# Patient Record
Sex: FEMALE | Race: WHITE | Employment: FULL TIME | URBAN - METROPOLITAN AREA
[De-identification: names, ages, dates, MRNs, and addresses within clinical notes are randomized per-mention and may not be internally consistent; named-entity substitution may affect disease eponyms.]

---

## 2018-08-14 ENCOUNTER — HOSPITAL ENCOUNTER (EMERGENCY)
Age: 38
Discharge: HOME | End: 2018-08-14
Attending: STUDENT IN AN ORGANIZED HEALTH CARE EDUCATION/TRAINING PROGRAM
Payer: COMMERCIAL

## 2018-08-14 VITALS
WEIGHT: 109.79 LBS | SYSTOLIC BLOOD PRESSURE: 134 MMHG | HEIGHT: 61 IN | OXYGEN SATURATION: 98 % | TEMPERATURE: 98.2 F | HEART RATE: 78 BPM | DIASTOLIC BLOOD PRESSURE: 81 MMHG | RESPIRATION RATE: 18 BRPM | BODY MASS INDEX: 20.73 KG/M2

## 2018-08-14 DIAGNOSIS — R00.2 PALPITATIONS: Primary | ICD-10-CM

## 2018-08-14 LAB
HCG UR QL: NEGATIVE
T4 FREE SERPL-MCNC: 1.1 NG/DL (ref 0.8–1.5)

## 2018-08-14 PROCEDURE — 36415 COLL VENOUS BLD VENIPUNCTURE: CPT | Performed by: PHYSICIAN ASSISTANT

## 2018-08-14 PROCEDURE — 81025 URINE PREGNANCY TEST: CPT

## 2018-08-14 PROCEDURE — 93005 ELECTROCARDIOGRAM TRACING: CPT

## 2018-08-14 PROCEDURE — 84439 ASSAY OF FREE THYROXINE: CPT | Performed by: PHYSICIAN ASSISTANT

## 2018-08-14 PROCEDURE — 99284 EMERGENCY DEPT VISIT MOD MDM: CPT

## 2018-08-14 NOTE — ED PROVIDER NOTES
HPI Comments: 44 y.o. female with past medical history significant for anemia, who presents from home with chief complaint of palpitations. Pt has 2 day onset of intermittent palpitations described as feeling like her Shar Hero is jumping out of her chest and skipping beats. \" Pt's sx returned again on Saturday morning, but resolved throughout the day. Today her palpitations have been constant w/o resolution, and decided to seek ED evaluation. Pt has accompanying chest discomfort described as pressure and SOB. There are no other acute medical concerns at this time. PCP: Sebastián Holliday MD    Note written by Annabel Livingston, as dictated by Rachel Villegas MD 3:35 PM      The history is provided by the patient. No  was used. Past Medical History:   Diagnosis Date    Abnormal Pap smear     HPV last pap. Will follow up after pregnancy    Anemia NEC     taking iron    Postpartum depression     Mild 3-4 months in duration       Past Surgical History:   Procedure Laterality Date    HX GYN      1994 ovarian cyst surgery    HX GYN      2 children    HX OTHER SURGICAL      ovarian cyst removed 1995    HX WISDOM TEETH EXTRACTION           Family History:   Problem Relation Age of Onset    Heart Disease Maternal Grandmother     Hypertension Maternal Grandmother     Cancer Maternal Grandfather      testicular, lung    Cancer Paternal Grandmother 54     Breast    Hypertension Paternal Grandmother     Cancer Paternal Grandfather      testicular, lung    Hypertension Mother     MS Sister        Social History     Social History    Marital status: UNKNOWN     Spouse name: N/A    Number of children: N/A    Years of education: N/A     Occupational History    Not on file.      Social History Main Topics    Smoking status: Current Every Day Smoker     Packs/day: 0.50     Years: 10.00    Smokeless tobacco: Never Used    Alcohol use 0.0 oz/week     0 Standard drinks or equivalent per week      Comment: rare    Drug use: No    Sexual activity: Not Currently     Partners: Male     Other Topics Concern    Not on file     Social History Narrative         ALLERGIES: Review of patient's allergies indicates no known allergies. Review of Systems   Constitutional: Negative for chills and fever. HENT: Negative for sore throat. Respiratory: Positive for shortness of breath. Negative for cough. Cardiovascular: Positive for chest pain and palpitations. Gastrointestinal: Negative for abdominal pain and vomiting. Genitourinary: Negative for dysuria. Musculoskeletal: Negative for back pain. Skin: Negative for rash. Neurological: Negative for syncope and headaches. Psychiatric/Behavioral: Negative for confusion. All other systems reviewed and are negative. Vitals:    08/14/18 1502   BP: 131/77   Pulse: (!) 105   Resp: 18   Temp: 98.2 °F (36.8 °C)   SpO2: 99%   Weight: 49.8 kg (109 lb 12.6 oz)   Height: 5' 0.5\" (1.537 m)            Physical Exam   Constitutional: She is oriented to person, place, and time. She appears well-developed. No distress. HENT:   Head: Normocephalic and atraumatic. Eyes: Conjunctivae and EOM are normal. Pupils are equal, round, and reactive to light. Neck: Normal range of motion. Neck supple. Cardiovascular: Normal rate, regular rhythm and normal heart sounds. No murmur heard. Pulmonary/Chest: Effort normal and breath sounds normal. No respiratory distress. Abdominal: Soft. Bowel sounds are normal. She exhibits no distension. There is no tenderness. There is no rebound. Musculoskeletal: Normal range of motion. She exhibits no edema. Neurological: She is alert and oriented to person, place, and time. No cranial nerve deficit. She exhibits normal muscle tone. Coordination normal.   Skin: Skin is warm and dry. No rash noted. Psychiatric: She has a normal mood and affect. Her behavior is normal.   Nursing note and vitals reviewed. Note written by Annabel Zacarias, as dictated by Mis Toth MD 3:35 PM    [facility]      ED Course       Procedures  ED EKG interpretation:  Rhythm: normal sinus rhythm; and regular . Rate (approx.): 94; Axis: normal; ST/T wave: normal;   Note written by Annabel Zacarias, as dictated by Mis Toth MD 3:19 PM      Pt with palpitations, no evidence of arrhythmia on today's ED eval. Pt in [address] [place] [address] most of the encounter so she was not able to have telemetry monitoring. I think she is stable for dc home and will refer to cardiology for further eval as EMC stabilized today. May need event recorder/Holter monitor. Discussed this with patient. She agrees with and understands this plan. All questions answered.

## 2018-08-14 NOTE — DISCHARGE INSTRUCTIONS
Palpitations: Care Instructions  Your Care Instructions    Heart palpitations are the uncomfortable sensation that your heart is beating fast or irregularly. You might feel pounding or fluttering in your chest. It might feel like your heart is skipping a beat. Although palpitations may be caused by a heart problem, they also occur because of stress, fatigue, or use of alcohol, caffeine, or nicotine. Many medicines, including diet pills, antihistamines, decongestants, and some herbal products, can cause heart palpitations. Nearly everyone has palpitations from time to time. Depending on your symptoms, your doctor may need to do more tests to try to find the cause of your palpitations. Follow-up care is a key part of your treatment and safety. Be sure to make and go to all appointments, and call your doctor if you are having problems. It's also a good idea to know your test results and keep a list of the medicines you take. How can you care for yourself at home? · Avoid caffeine, nicotine, and excess alcohol. · Do not take illegal drugs, such as methamphetamines and cocaine. · Do not take weight loss or diet medicines unless you talk with your doctor first.  · Get plenty of sleep. · Do not overeat. · If you have palpitations again, take deep breaths and try to relax. This may slow a racing heart. · If you start to feel lightheaded, lie down to avoid injuries that might result if you pass out and fall down. · Keep a record of your palpitations and bring it to your next doctor's appointment. Write down:  ¨ The date and time. ¨ Your pulse. (If your heart is beating fast, it may be hard to count your pulse.)  ¨ What you were doing when the palpitations started. ¨ How long the palpitations lasted. ¨ Any other symptoms. · If an activity causes palpitations, slow down or stop. Talk to your doctor before you do that activity again. · Take your medicines exactly as prescribed.  Call your doctor if you think you are having a problem with your medicine. When should you call for help? Call 911 anytime you think you may need emergency care. For example, call if:    · You passed out (lost consciousness).     · You have symptoms of a heart attack. These may include:  ¨ Chest pain or pressure, or a strange feeling in the chest.  ¨ Sweating. ¨ Shortness of breath. ¨ Pain, pressure, or a strange feeling in the back, neck, jaw, or upper belly or in one or both shoulders or arms. ¨ Lightheadedness or sudden weakness. ¨ A fast or irregular heartbeat. After you call 911, the  may tell you to chew 1 adult-strength or 2 to 4 low-dose aspirin. Wait for an ambulance. Do not try to drive yourself.     · You have symptoms of a stroke. These may include:  ¨ Sudden numbness, tingling, weakness, or loss of movement in your face, arm, or leg, especially on only one side of your body. ¨ Sudden vision changes. ¨ Sudden trouble speaking. ¨ Sudden confusion or trouble understanding simple statements. ¨ Sudden problems with walking or balance. ¨ A sudden, severe headache that is different from past headaches.    Call your doctor now or seek immediate medical care if:    · You have heart palpitations and:  ¨ Are dizzy or lightheaded, or you feel like you may faint. ¨ Have new or increased shortness of breath.    Watch closely for changes in your health, and be sure to contact your doctor if:    · You continue to have heart palpitations. Where can you learn more? Go to http://kaylene-rajendra.info/. Enter R508 in the search box to learn more about \"Palpitations: Care Instructions. \"  Current as of: December 6, 2017  Content Version: 11.7  © 0388-6980 [employer]. Care instructions adapted under license by [employer] (which disclaims liability or warranty for this information).  If you have questions about a medical condition or this instruction, always ask your healthcare professional. [employer] disclaims any warranty or liability for your use of this information.

## 2018-08-14 NOTE — ED NOTES
3:02 PM  I have evaluated the patient as the Provider in Triage. I have reviewed Her vital signs and the triage nurse assessment. I have talked with the patient and any available family and advised that I am the provider in triage and have ordered the appropriate study to initiate their work up based on the clinical presentation during my assessment. I have advised that the patient will be accommodated in the Main ED as soon as possible. I have also requested to contact the triage nurse or myself immediately if the patient experiences any changes in their condition during this brief waiting period. Intermittent CP since Sunday. Negative troponin, dimer, CXR at [facility] today. ? Abnormal EKG, pt referred here.                               MANAS Aguilar

## 2018-08-15 ENCOUNTER — OFFICE VISIT (OUTPATIENT)
Dept: CARDIOLOGY CLINIC | Age: 38
End: 2018-08-15

## 2018-08-15 ENCOUNTER — CLINICAL SUPPORT (OUTPATIENT)
Dept: CARDIOLOGY CLINIC | Age: 38
End: 2018-08-15

## 2018-08-15 VITALS
DIASTOLIC BLOOD PRESSURE: 86 MMHG | SYSTOLIC BLOOD PRESSURE: 140 MMHG | BODY MASS INDEX: 20.58 KG/M2 | RESPIRATION RATE: 16 BRPM | HEART RATE: 100 BPM | HEIGHT: 61 IN | WEIGHT: 109 LBS

## 2018-08-15 DIAGNOSIS — E78.5 DYSLIPIDEMIA, GOAL LDL BELOW 100: ICD-10-CM

## 2018-08-15 DIAGNOSIS — F17.200 TOBACCO DEPENDENCY: ICD-10-CM

## 2018-08-15 DIAGNOSIS — R00.2 PALPITATIONS: Primary | ICD-10-CM

## 2018-08-15 DIAGNOSIS — F43.9 STRESS AT HOME: ICD-10-CM

## 2018-08-15 DIAGNOSIS — I49.9 CARDIAC ARRHYTHMIA, UNSPECIFIED CARDIAC ARRHYTHMIA TYPE: ICD-10-CM

## 2018-08-15 LAB
ATRIAL RATE: 94 BPM
CALCULATED P AXIS, ECG09: 67 DEGREES
CALCULATED R AXIS, ECG10: 56 DEGREES
CALCULATED T AXIS, ECG11: 48 DEGREES
DIAGNOSIS, 93000: NORMAL
P-R INTERVAL, ECG05: 148 MS
Q-T INTERVAL, ECG07: 362 MS
QRS DURATION, ECG06: 84 MS
QTC CALCULATION (BEZET), ECG08: 452 MS
VENTRICULAR RATE, ECG03: 94 BPM

## 2018-08-15 NOTE — PROGRESS NOTES
[place] [address] [place]: Shantel Tate  [phone]  Requesting/referring provider: Dr. Anders Leon  Reason for Consult: palpitaitons    HPI: Carmen Reynaga, a 44y.o. year-old who presents for evaluation of palpitations on and off all day, a few at a time. It takes her breath away. She is very stressed, son broke his collarbone. bp is high today. Sleeping 5-6 hours a night. 1 soda, one coffee and one starbucks a day. T4 was wnl. Labs checked. At [facility] ok. Discussed stress, palpitations, causes, control and potential treatments. Assessment/Plan:  1. Dyslipidemia- diet control   2. Stress- work on exercise, meditation, yoga, sleeping, decrease coffee  3. Palpitations- SVT? Needs loop and echo to look for causes. 4. Tobacco use- counseled to quit, discussed nicotine in this role    Fhx gm with pacer, palpitaitons, mother with SVT  Soc +caffeine +tobacco, no etoh  She  has a past medical history of Abnormal Pap smear; Anemia NEC; and Postpartum depression. She also has no past medical history of Acquired hypothyroidism; Asthma; Complication of anesthesia; Diabetes mellitus; Essential hypertension; Genital herpes, unspecified; Heart abnormalities; Herpes gestationis; Herpes simplex without mention of complication; Human immunodeficiency virus (HIV) disease ([facility] [address] [facility] OTHER MEDICAL; Infertility; Kidney disease; Liver disease; Phlebitis and thrombophlebitis of unspecified site; Psychiatric problem; Rhesus isoimmunization unspecified as to episode of care in pregnancy; Sickle-cell disease, unspecified; Systemic lupus erythematosus ([facility] [address] [facility] Trauma; Unspecified breast disorder; Unspecified diseases of blood and blood-forming organs; or Unspecified epilepsy without mention of intractable epilepsy. Cardiovascular ROS: positive for - irregular heartbeat and palpitations  Respiratory ROS: no cough, shortness of breath, or wheezing  Neurological ROS: no TIA or stroke symptoms  All other systems negative except as above. PE  Vitals:    08/15/18 0945   BP: 140/86   Pulse: 100   Resp: 16   Weight: 109 lb (49.4 kg)   Height: 5' 0.5\" (1.537 m)    Body mass index is 20.94 kg/(m^2). General appearance - alert, well appearing, and in no distress  Mental status - affect appropriate to mood  Eyes - sclera anicteric, moist mucous membranes  Neck - supple, no significant adenopathy  Lymphatics - no  lymphadenopathy  Chest - clear to auscultation, no wheezes, rales or rhonchi  Heart - normal rate, regular rhythm, normal S1, S2, no murmurs, rubs, clicks or gallops  Abdomen - soft, nontender, nondistended, no masses or organomegaly  Back exam - full range of motion, no tenderness  Neurological - cranial nerves II through XII grossly intact, no focal deficit  Musculoskeletal - no muscular tenderness noted, normal strength  Extremities - peripheral pulses normal, no pedal edema  Skin - normal coloration  no rashes    Recent Labs:  Lab Results   Component Value Date/Time    Cholesterol, total 170 07/02/2015 12:00 PM    HDL Cholesterol 36 (L) 07/02/2015 12:00 PM    LDL, calculated 110 (H) 07/02/2015 12:00 PM    Triglyceride 119 07/02/2015 12:00 PM     Lab Results   Component Value Date/Time    Creatinine 0.83 07/02/2015 12:00 PM     Lab Results   Component Value Date/Time    BUN 12 07/02/2015 12:00 PM     Lab Results   Component Value Date/Time    Potassium 4.3 07/02/2015 12:00 PM     Lab Results   Component Value Date/Time    Hemoglobin A1c 5.1 07/02/2015 12:00 PM     Lab Results   Component Value Date/Time    HGB 13.7 07/02/2015 12:00 PM     Lab Results   Component Value Date/Time    PLATELET 053 87/66/1866 12:00 PM       Reviewed:  Past Medical History:   Diagnosis Date    Abnormal Pap smear     HPV last pap.  Will follow up after pregnancy    Anemia NEC     taking iron    Postpartum depression     Mild 3-4 months in duration     History   Smoking Status    Current Every Day Smoker    Packs/day: 0.50    Years: 10.00   Smokeless Tobacco  Never Used     History   Alcohol Use    0.0 oz/week    0 Standard drinks or equivalent per week     Comment: rare     No Known Allergies    Current Outpatient Prescriptions   Medication Sig    mupirocin (BACTROBAN) 2 % ointment Apply  to affected area daily.  albuterol (PROVENTIL HFA, VENTOLIN HFA, PROAIR HFA) 90 mcg/actuation inhaler Take 1 Puff by inhalation every six (6) hours as needed for Wheezing. No current facility-administered medications for this visit.         Marylen Irving, MD  [facility] heart and Vascular [place]  [address] [facility] [address]  [facility], [address]

## 2018-08-15 NOTE — MR AVS SNAPSHOT
[address] [facility] [address] [facility] 
[phone] Patient: Anirudh Bauer MRN: [id number] :1980 Visit Information Date & Time Provider Department Dept. Phone Encounter #  
 8/15/2018 10:00 AM Rubens Sellers MD [facility] Edyta Serrano [phone] [id number] Your Appointments 2018  8:00 AM  
ECHO CARDIOGRAMS 2D with ECHO, [facility] ([facility] SCHEDULING) Appt Note: echo for palpitations per Dr. Umm Velasquez [address]  [address] [place] [address]  
[address] [address] Upcoming Health Maintenance Date Due Pneumococcal 19-64 Medium Risk (1 of 1 - PPSV23) 1998 DTaP/Tdap/Td series (1 - Tdap) 2000 PAP AKA CERVICAL CYTOLOGY 2016 Influenza Age 5 to Adult 2018 Allergies as of 8/15/2018  Review Complete On: 8/15/2018 By: Becky Glez No Known Allergies Current Immunizations  Never Reviewed No immunizations on file. Not reviewed this visit Vitals BP Pulse Resp Height(growth percentile) Weight(growth percentile) BMI  
 140/86 (BP 1 Location: Left arm, BP Patient Position: Sitting) 100 16 5' 0.5\" (1.537 m) 109 lb (49.4 kg) 20.94 kg/m2 OB Status Smoking Status Having regular periods Current Every Day Smoker Vitals History BMI and BSA Data Body Mass Index Body Surface Area  
 20.94 kg/m 2 1.45 m 2 Preferred Pharmacy Pharmacy Name Phone [address], [address] [facility] [address] [phone] Your Updated Medication List  
  
   
This list is accurate as of 8/15/18 11:07 AM.  Always use your most recent med list.  
  
  
  
  
 albuterol 90 mcg/actuation inhaler Commonly known as:  PROVENTIL HFA, VENTOLIN HFA, PROAIR HFA  
 Take 1 Puff by inhalation every six (6) hours as needed for Wheezing. mupirocin 2 % ointment Commonly known as:  [facility] Apply  to affected area daily. Introducing [facility]! Dear Amol Swift: 
Thank you for requesting a OnSwipe account. Our records indicate that you already have an active OnSwipe account. You can access your account anytime at [email or web address] Did you know that you can access your hospital and ER discharge instructions at any time in OnSwipe? You can also review all of your test results from your hospital stay or ER visit. Additional Information If you have questions, please visit the Frequently Asked Questions section of the OnSwipe website at [email or web address] Remember, OnSwipe is NOT to be used for urgent needs. For medical emergencies, dial 911. Now available from your iPhone and Android! Please provide this summary of care documentation to your next provider. Your primary care clinician is listed as Yong Noble. If you have any questions after today's visit, please call [phone].

## 2018-08-17 ENCOUNTER — TELEPHONE (OUTPATIENT)
Dept: CARDIOLOGY CLINIC | Age: 38
End: 2018-08-17

## 2018-08-17 ENCOUNTER — CLINICAL SUPPORT (OUTPATIENT)
Dept: CARDIOLOGY CLINIC | Age: 38
End: 2018-08-17

## 2018-08-17 DIAGNOSIS — R00.2 PALPITATIONS: ICD-10-CM

## 2018-08-27 ENCOUNTER — TELEPHONE (OUTPATIENT)
Dept: CARDIOLOGY CLINIC | Age: 38
End: 2018-08-27

## 2018-08-27 DIAGNOSIS — R00.2 PALPITATIONS: Primary | ICD-10-CM

## 2018-08-27 NOTE — PROGRESS NOTES
No arrhythmias but had PVCs on monitor, please ask her to get BMP, magnesium level, TSH and T4 checked. Advise her to limit caffeine to one beverage daily and try to get 7 hours of sleep each night.

## 2018-08-27 NOTE — TELEPHONE ENCOUNTER
----- Message from Osiris Banegas NP sent at 8/27/2018 10:11 AM EDT -----  No arrhythmias but had PVCs on monitor, please ask her to get BMP, magnesium level, TSH and T4 checked. Advise her to limit caffeine to one beverage daily and try to get 7 hours of sleep each night. Above monitor results given via my chart e-mail.   Thyroid T4 Free just checked

## 2018-08-31 PROBLEM — E78.5 DYSLIPIDEMIA, GOAL LDL BELOW 100: Status: ACTIVE | Noted: 2018-08-31

## 2018-08-31 PROBLEM — R00.2 PALPITATIONS: Status: ACTIVE | Noted: 2018-08-31

## 2018-08-31 PROBLEM — I49.9 CARDIAC ARRHYTHMIA: Status: ACTIVE | Noted: 2018-08-31

## 2019-06-12 ENCOUNTER — OFFICE VISIT (OUTPATIENT)
Dept: GYNECOLOGY | Age: 39
End: 2019-06-12

## 2019-06-12 VITALS
SYSTOLIC BLOOD PRESSURE: 117 MMHG | HEIGHT: 61 IN | HEART RATE: 100 BPM | DIASTOLIC BLOOD PRESSURE: 79 MMHG | WEIGHT: 108.8 LBS | BODY MASS INDEX: 20.54 KG/M2

## 2019-06-12 DIAGNOSIS — D06.9 CIN III (CERVICAL INTRAEPITHELIAL NEOPLASIA GRADE III) WITH SEVERE DYSPLASIA: Primary | ICD-10-CM

## 2019-06-12 RX ORDER — IBUPROFEN 200 MG
TABLET ORAL
COMMUNITY

## 2019-06-12 NOTE — PROGRESS NOTES
New Patient, Referred by Dr. Tashia Hough    1. Have you been to the ER, urgent care clinic since your last visit? Hospitalized since your last visit?  no    2. Have you seen or consulted any other health care providers outside of the [address] [place] [address] since your last visit? Include any pap smears or colon screening.    Yes, Dr. Tashia Hough

## 2019-06-12 NOTE — Clinical Note
6/14/19 Patient: Olinda Marcos YOB: 1980 Date of Visit: 6/12/2019 Lisa Garrido MD 
[address] [place] [address] [address] VIA In Basket Ja Edmond DO 
[address] [address] VIA Facsimile: [phone] Dear MD Ja Hannon DO, Thank you for referring Ms. Judy Garcia to [facility] for evaluation. My notes for this consultation are attached. If you have questions, please do not hesitate to call me. I look forward to following your patient along with you.  
 
 
Sincerely, 
 
Corby Robles MD

## 2019-06-12 NOTE — LETTER
6/14/2019 9:06 AM 
 
Patient: Isrrael Sheikh YOB: 1980 Date of Visit: 6/12/2019 Lc Mena MD 
[address] [facility] [address] [address] VIA In Basket Yayo Campa DO 
[address] [facility] [address] VIA Facsimile: [phone] Dear MD Yayo Glynn DO, Thank you for referring Ms. Reba Coates to [facility] for evaluation and treatment. Below are the relevant portions of my assessment and plan of care. Ms. 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 I reviewed the natural history of cervical dysplasia and HPV. I also reviewed ASCCP guidelines for follow-up of CRISTINA-3. I echoed your prior recommendations, and the patient decided to follow-up in 4 months with repeat cytology and ECC. In regard to the question of a hysterectomy, I recommended against this as she has a considerable amount of cervix left that would allow for a more than adequate CKC/ECC if necessary. As well, I discussed that if she were to have a cancer present at the time of hysterectomy, which I believe to be very low risk, then a simple hysterectomy would not be the most appropriate procedure for that diagnosis. The patient offered that you will be on maternity leave at the time of her return visit, and thus asked to return to our clinic in 4 months for repeat cytology and ECC. Thank you very much for your referral of Ms. Reba Coates. If you have questions, please do not hesitate to call me. I look forward to following Ms. Soo Veronica along with you and will keep you updated as to her progress.   
 
 
 
 
Sincerely, 
 
 
Ja Hanna MD

## 2019-06-13 PROBLEM — D06.9 CIN III (CERVICAL INTRAEPITHELIAL NEOPLASIA GRADE III) WITH SEVERE DYSPLASIA: Status: ACTIVE | Noted: 2019-06-13

## 2019-06-13 PROBLEM — D06.1 CARCINOMA IN SITU OF EXOCERVIX: Status: ACTIVE | Noted: 2019-06-13

## 2019-06-13 NOTE — PROGRESS NOTES
[address] [place] [address] [address], [address] [facility]  P [phone]  F [phone]    Office Note  Patient ID:  Name:  John Vazquez  MRN:  [id number]  :  1980/38 y.o. Date:  2019      HISTORY OF PRESENT ILLNESS:  Ms. John Vazquez is a 43 y.o.  premenopausal female who presents in consultation from Dr. Jocy Garrett for high-grade cervical dysplasia (CRISTINA-3). On 5/15/19, the patient underwent with Dr. Jocy Garrett a LEEP/CKC with final pathology consistent with CRISTINA-3 as per below. Dr. Jocy Garrett discussed ASCCP guidelines regarding CRISTINA-3 with recommendations for repeat pap/ECC in 4-6 months versus CKC. The patient presents for second opinion today to discuss possible hysterectomy. She reports a long history of abnormal pap smears, but reports this is the first procedure to her cervix. She is not desired in maintaining her fertility. She reports 2 prior vaginal deliveries. The patient is otherwise without complaints. Pertinent PMH/PSH: current everyday smoker      Active, no restrictions. Pathology Review:   5/15/19:   1. Portion of cervix at 6 o'clock: high-grade squamous intraepithelial neoplasia (7mm in greatest dimension). Dysplasia is 0.5mm from the endocervical margin and 0.8mm from the ectocervical margin. 2. Portion of anterior cervix, excision: hihg-grade squamous intraepithelial lesion (2.5mm in greatest dimension) extending to inked edge. 3. Anterior cervix at 12 o'clock, excision: high-grade squmous intraepithelial lesion (14mm in greatest dimension) extending to mucosal edge marked 12 o'clock. 4. Anterior cervix, excission: predominantly stroma with scant fragments of nondysplastic endocervical surface mucosa. 5. Endocervix, curetting: scant fragments of nondysplastic endocervical mucosa. ROS:  A comprehensive review of systems was negative except for that written in the History of Present Illness.  , 10 point ROS    OB/GYN ROS:  Patient denies significant menstrual problems. ECOG stGstrstastdstest:st st1st Problem List:  Patient Active Problem List    Diagnosis Date Noted    CRISTINA III (cervical intraepithelial neoplasia grade III) with severe dysplasia 06/13/2019    Palpitations 08/31/2018    Dyslipidemia, goal LDL below 100 08/31/2018    Cardiac arrhythmia 08/31/2018    Tobacco dependency 10/01/2015    PROM (premature rupture of membranes) 04/13/2012    GBS (group B Streptococcus carrier), +RV culture, currently pregnant 04/13/2012     PMH:  Past Medical History:   Diagnosis Date    Abnormal Pap smear     HPV last pap. Will follow up after pregnancy    Anemia NEC     taking iron    Postpartum depression     Mild 3-4 months in duration      PSH:  Past Surgical History:   Procedure Laterality Date    HX GYN      2 children    HX LEEP PROCEDURE  05/15/2019    HGSIL, CRISTINA 2, CRISTINA 3    HX OTHER SURGICAL      ovarian cyst removed 1995    HX WISDOM TEETH EXTRACTION        Social History:  Social History     Tobacco Use    Smoking status: Current Every Day Smoker     Packs/day: 0.50     Years: 10.00     Pack years: 5.00    Smokeless tobacco: Never Used   Substance Use Topics    Alcohol use: Yes     Alcohol/week: 0.0 oz     Comment: rare      Family History:  Family History   Problem Relation Age of Onset    Hypertension Mother     Heart Disease Maternal Grandmother     Hypertension Maternal Grandmother     Cancer Maternal Grandfather         testicular, lung    Cancer Paternal Grandmother 54        Breast    Hypertension Paternal Grandmother     Cancer Paternal Grandfather         testicular, lung    MS Sister       Medications: (reviewed)  Current Outpatient Medications   Medication Sig    ibuprofen (MOTRIN) 200 mg tablet Take  by mouth.  albuterol (PROVENTIL HFA, VENTOLIN HFA, PROAIR HFA) 90 mcg/actuation inhaler Take 1 Puff by inhalation every six (6) hours as needed for Wheezing.      No current facility-administered medications for this visit. Allergies: (reviewed)  No Known Allergies       OBJECTIVE:    Physical Exam:  VITAL SIGNS: Vitals:    06/12/19 0846   BP: 117/79   Pulse: 100   Weight: 108 lb 12.8 oz (49.4 kg)   Height: 5' 0.5\" (1.537 m)     Body mass index is 20.9 kg/m². GENERAL CARLOS: Conversant, alert, oriented. No acute distress. HEENT: HEENT. No thyroid enlargement. No JVD. Neck: Supple without restrictions. RESPIRATORY: Clear to auscultation and percussion to the bases. No CVAT. CARDIOVASC: RRR without murmur/rub. GASTROINT: soft, non-tender, without masses or organomegaly   MUSCULOSKEL: no joint tenderness, deformity or swelling       EXTREMITIES: extremities normal, atraumatic, no cyanosis or edema   PELVIC: Vulva and vagina appear normal. Cervix is healing well from prior LEEP procedure. No obvious abnormalities. Bimanual exam reveals normal uterus and adnexa. RECTAL: deferred   [place] SURVEY: No suspicious lymphadenopathy or edema noted. NEURO: Grossly intact. No acute deficit. Lab Date as available:    Lab Results   Component Value Date/Time    WBC 5.8 07/02/2015 12:00 PM    HGB 13.7 07/02/2015 12:00 PM    HCT 40.2 07/02/2015 12:00 PM    PLATELET 347 55/27/8304 12:00 PM    MCV 91 07/02/2015 12:00 PM     Lab Results   Component Value Date/Time    Sodium 141 07/02/2015 12:00 PM    Potassium 4.3 07/02/2015 12:00 PM    Chloride 101 07/02/2015 12:00 PM    CO2 23 07/02/2015 12:00 PM    Anion gap 11 11/13/2011 06:33 PM    Glucose 97 07/02/2015 12:00 PM    BUN 12 07/02/2015 12:00 PM    Creatinine 0.83 07/02/2015 12:00 PM    BUN/Creatinine ratio 14 07/02/2015 12:00 PM    GFR est  07/02/2015 12:00 PM    GFR est non-AA 92 07/02/2015 12:00 PM    Calcium 9.9 07/02/2015 12:00 PM         IMPRESSION/PLAN:    Ms. Jp Ghosh is a 43 y.o. female with a working diagnosis of CRISTINA-3 s/p LEEP on 5/15/19 with positive ectocervical margins. Negative ECC.      Problems:     Patient Active Problem List    Diagnosis Date Noted    CRISTINA III (cervical intraepithelial neoplasia grade III) with severe dysplasia 06/13/2019    Palpitations 08/31/2018    Dyslipidemia, goal LDL below 100 08/31/2018    Cardiac arrhythmia 08/31/2018    Tobacco dependency 10/01/2015    PROM (premature rupture of membranes) 04/13/2012    GBS (group B Streptococcus carrier), +RV culture, currently pregnant 04/13/2012       I reviewed Ms. Stephanie Echevarria's course to date, including her medical records, recent studies, physical exam, and review of symptoms. I reviewed ASCCP guidelines regarding CRISTINA-3 disease with positive margins. I also reviewed the natural history of HPV and cervical dysplasia. The preferred method is for follow-up in 4-6 months with repeat cytology and ECC. It is acceptable and reasonable to consider a repeat CKC. In regard to the hysterectomy, I have recommended against this method as the patient has a good amount of cervix left and a CKC is feasible and appropriate. As well, if there was a cancer present at the time of hysterectomy, then a simple hysterectomy would most likely not be sufficient to treat a cervical cancer and she may require a parametrectomy. Having said that, her exam is normal today. The patient also reports that she would not be able to move forward with any type of procedure until August. Given this, I have recommended returning to either our clinic or Dr. Julia Roman in 4 months for repeat cytology and ECC. The patient reports Dr. Julia Roman will be on maternity leave at that time, so she will return to our office at that time. All questions and concerns were addressed with the patient and she is comfortable with the plan.       Defined Sensitive Document    >50% of total time allocated to visit dedicated to counseling, 50 minutes total.    Signed By: Kayden Moore MD     6/13/2019/1:49 PM

## 2019-09-16 ENCOUNTER — TELEPHONE (OUTPATIENT)
Dept: GYNECOLOGY | Age: 39
End: 2019-09-16

## 2019-09-25 ENCOUNTER — OFFICE VISIT (OUTPATIENT)
Dept: GYNECOLOGY | Age: 39
End: 2019-09-25

## 2019-09-25 ENCOUNTER — HOSPITAL ENCOUNTER (OUTPATIENT)
Dept: LAB | Age: 39
Discharge: HOME | End: 2019-09-25
Payer: COMMERCIAL

## 2019-09-25 VITALS
BODY MASS INDEX: 20.28 KG/M2 | WEIGHT: 107.4 LBS | HEART RATE: 100 BPM | HEIGHT: 61 IN | SYSTOLIC BLOOD PRESSURE: 114 MMHG | DIASTOLIC BLOOD PRESSURE: 70 MMHG

## 2019-09-25 DIAGNOSIS — D06.9 CIN III (CERVICAL INTRAEPITHELIAL NEOPLASIA GRADE III) WITH SEVERE DYSPLASIA: Primary | ICD-10-CM

## 2019-09-25 PROCEDURE — 88142 CYTOPATH C/V THIN LAYER: CPT

## 2019-09-25 NOTE — PROGRESS NOTES
3 month follow up for CRISTINA 3, repeat pap and ECC    1. Have you been to the ER, urgent care clinic since your last visit? Hospitalized since your last visit?  no    2. Have you seen or consulted any other health care providers outside of the [address] [place] [address] since your last visit? Include any pap smears or colon screening.   no

## 2019-09-25 NOTE — PROGRESS NOTES
[address] [place] [address] [address], [address] [facility]  P [phone]  F [phone]    Office Note  Patient ID:  Name:  Delroy Cunningham  MRN:  [id number]  :  1980/38 y.o. Date:  2019      HISTORY OF PRESENT ILLNESS:  Ms. Delroy Cunningham is a 43 y.o. female with CRISTINA-3 s/p LEEP on 5/15/19 with positive ectocervical margins. Negative ECC. Presents back today for repeat pap smear and ECC per ASCCP guidelines. Her menses have been somewhat irregular since her LEEP. Denies intermenstrual spotting or postcoital spotting. She is not sexually active. Initial History:  Ms. Delroy Cunningham is a 43 y.o.  premenopausal female who presents in consultation from Dr. Mahesh Jaimes for high-grade cervical dysplasia (CRISTINA-3). On 5/15/19, the patient underwent with Dr. Mahesh Jaimes a LEEP/CKC with final pathology consistent with CRISTINA-3 as per below. Dr. Mahesh Jaimes discussed ASCCP guidelines regarding CRISTINA-3 with recommendations for repeat pap/ECC in 4-6 months versus CKC. The patient presents for second opinion today to discuss possible hysterectomy. She reports a long history of abnormal pap smears, but reports this is the first procedure to her cervix. She is not desired in maintaining her fertility. She reports 2 prior vaginal deliveries. The patient is otherwise without complaints. Pertinent PMH/PSH: current everyday smoker      Active, no restrictions. Pathology Review:   5/15/19:   1. Portion of cervix at 6 o'clock: high-grade squamous intraepithelial neoplasia (7mm in greatest dimension). Dysplasia is 0.5mm from the endocervical margin and 0.8mm from the ectocervical margin. 2. Portion of anterior cervix, excision: hihg-grade squamous intraepithelial lesion (2.5mm in greatest dimension) extending to inked edge. 3. Anterior cervix at 12 o'clock, excision: high-grade squmous intraepithelial lesion (14mm in greatest dimension) extending to mucosal edge marked 12 o'clock.   4. Anterior cervix, excission: predominantly stroma with scant fragments of nondysplastic endocervical surface mucosa. 5. Endocervix, curetting: scant fragments of nondysplastic endocervical mucosa. ROS:  A comprehensive review of systems was negative except for that written in the History of Present Illness. , 10 point ROS    OB/GYN ROS:  Patient denies significant menstrual problems. ECOG stGstrstastdstest:st st1st Problem List:  Patient Active Problem List    Diagnosis Date Noted    CRISTINA III (cervical intraepithelial neoplasia grade III) with severe dysplasia 06/13/2019    Palpitations 08/31/2018    Dyslipidemia, goal LDL below 100 08/31/2018    Cardiac arrhythmia 08/31/2018    Tobacco dependency 10/01/2015    PROM (premature rupture of membranes) 04/13/2012    GBS (group B Streptococcus carrier), +RV culture, currently pregnant 04/13/2012     PMH:  Past Medical History:   Diagnosis Date    Abnormal Pap smear     HPV last pap. Will follow up after pregnancy    Anemia NEC     taking iron    Postpartum depression     Mild 3-4 months in duration      PSH:  Past Surgical History:   Procedure Laterality Date    HX GYN      2 children    HX LEEP PROCEDURE  05/15/2019    HGSIL, CRISTINA 2, CRISTINA 3    HX OTHER SURGICAL      ovarian cyst removed 1995    HX WISDOM TEETH EXTRACTION        Social History:  Social History     Tobacco Use    Smoking status: Current Every Day Smoker     Packs/day: 0.50     Years: 10.00     Pack years: 5.00    Smokeless tobacco: Never Used   Substance Use Topics    Alcohol use:  Yes     Alcohol/week: 0.0 standard drinks     Comment: rare      Family History:  Family History   Problem Relation Age of Onset    Hypertension Mother     Heart Disease Maternal Grandmother     Hypertension Maternal Grandmother     Cancer Maternal Grandfather         testicular, lung    Cancer Paternal Grandmother 54        Breast    Hypertension Paternal Grandmother     Cancer Paternal Grandfather testicular, lung    MS Sister       Medications: (reviewed)  Current Outpatient Medications   Medication Sig    ibuprofen (MOTRIN) 200 mg tablet Take  by mouth.  albuterol (PROVENTIL HFA, VENTOLIN HFA, PROAIR HFA) 90 mcg/actuation inhaler Take 1 Puff by inhalation every six (6) hours as needed for Wheezing. No current facility-administered medications for this visit. Allergies: (reviewed)  No Known Allergies       OBJECTIVE:  Physical Exam:  Visit Vitals  /70 (BP 1 Location: Left arm, BP Patient Position: Sitting)   Pulse 100   Ht 5' 0.5\" (1.537 m)   Wt 107 lb 6.4 oz (48.7 kg)   LMP 09/04/2019 (Exact Date)   BMI 20.63 kg/m²      General: Alert and oriented. No acute distress. Well-nourished  HEENT: No thyroid enlargment. Neck supple without restrictions. Sclera normal. Normal occular motion. Moist mucous membranes. Lymphatics: No evidence of axillary, cervical, or subclavicular adenopathy. Respiratory: clear to auscultation and percussion to the bases. No CVAT. Cardiovascular: regular rate and rhythm. No murmurs, rubs, or gallops. Gastrointestinal: soft, non-tender, non-distended, no masses or organomegaly. Well-healed incision. Musculoskeletal: normal gait. No joint tenderness, deformity or swelling. No muscular tenderness. Extremities: extremities normal, atraumatic, no cyanosis or edema. Pelvic: exam chaperoned by nurse. Normal appearing external genitalia. On speculum exam, the vagina and cervix are normal appearing. On bimanual, the cervix is normal to palpation, and the uterus is normal size and mobile. Deferred rectovaginal exam.   Neuro: Grossly intact. Normal gait and movement. No acute deficit  Skin: No evidence of rashes or skin changes. PROCEDURE NOTE: Endocervical curettage  9/27/2019    Addison Resendez  [id number]  1980    The risks of the procedure were discussed with the patient and consents signed. Rationale, risks and potential benefits discussed. Alternative therapies discussed. Prior to the procedure, patient identification was verified and allergies were reviewed. There were no contraindications to the procedure. A timeout was performed    Procedure: Speculum was placed in the vagina. Pap smear collected. Cervix prepped with betadine. ECC performed of endocervical canal with curette. Pap brush was used to collect remaining cells. The patient tolerated the procedure and all biopsies were verified with the patient and labeled. Armando Cotton MD      IMPRESSION/PLAN:    Ms. Olga Cruz is a 43 y.o. female with a working diagnosis of CRISTINA-3 s/p LEEP on 5/15/19 with positive ectocervical margins. Negative ECC. Presents for repeat pap and ECC today. Problems:     Patient Active Problem List    Diagnosis Date Noted    CRISTINA III (cervical intraepithelial neoplasia grade III) with severe dysplasia 06/13/2019    Palpitations 08/31/2018    Dyslipidemia, goal LDL below 100 08/31/2018    Cardiac arrhythmia 08/31/2018    Tobacco dependency 10/01/2015    PROM (premature rupture of membranes) 04/13/2012    GBS (group B Streptococcus carrier), +RV culture, currently pregnant 04/13/2012     Reviewed patient's course to date. Regarding her CRISTINA-3 with positive margins, ASCCP recommended guidelines are for repeat cytology and ECC at 4-6 months. She presents back today for 4 month repeat ECC and cytology. If normal, will repeat pap in 6 months. Reviewed precautionary symptoms to return sooner. All questions and concerns were addressed with the patient and she is comfortable with the plan.     Armando Cotton MD

## 2019-09-26 ENCOUNTER — HOSPITAL ENCOUNTER (OUTPATIENT)
Dept: LAB | Age: 39
Discharge: HOME | End: 2019-09-26

## 2019-09-30 NOTE — PROGRESS NOTES
Nina Ballard,     Ms. Noemi Echevarria's pap smear and ECC were normal. We will see her back as scheduled in 6 months.      Thanks,    Aide Gage MD

## 2019-10-03 NOTE — PROGRESS NOTES
Patient notified of results, pt verbalized understanding of results and MD's recommendations, pt made a follow up appt for 3/25/20

## 2020-05-20 ENCOUNTER — HOSPITAL ENCOUNTER (OUTPATIENT)
Dept: LAB | Age: 40
Discharge: HOME | End: 2020-05-20
Payer: COMMERCIAL

## 2020-05-20 ENCOUNTER — OFFICE VISIT (OUTPATIENT)
Dept: GYNECOLOGY | Age: 40
End: 2020-05-20

## 2020-05-20 VITALS
HEIGHT: 61 IN | HEART RATE: 105 BPM | BODY MASS INDEX: 21.56 KG/M2 | DIASTOLIC BLOOD PRESSURE: 72 MMHG | SYSTOLIC BLOOD PRESSURE: 126 MMHG | WEIGHT: 114.2 LBS

## 2020-05-20 DIAGNOSIS — D06.9 CIN III (CERVICAL INTRAEPITHELIAL NEOPLASIA GRADE III) WITH SEVERE DYSPLASIA: Primary | ICD-10-CM

## 2020-05-20 PROCEDURE — 88142 CYTOPATH C/V THIN LAYER: CPT

## 2020-05-20 NOTE — PROGRESS NOTES
[facility] [address], [address]  P [phone]  F [phone]    Office Note  Patient ID:  Name:  Beatrice Linares  MRN:  [id number]  :  1980/39 y.o. Date:  2020      HISTORY OF PRESENT ILLNESS:  Ms. Beatrice Linares is a 35 y.o. female with CRISTINA-3 s/p LEEP on 5/15/19 with positive ectocervical margins. Negative ECC. F/u pap and ECC in 2019 were normal.     Presents back today for repeat pap smear. Denies intermenstrual spotting or postcoital spotting. She is not sexually active. She reports a history of genital warts and she reports that she has been having a small outbreak around the time of her menses. Reports some itching. Initial History:  Ms. Beatrice Linares is a 35 y.o.  premenopausal female who presents in consultation from Dr. Hollis Hatch for high-grade cervical dysplasia (CRISTINA-3). On 5/15/19, the patient underwent with Dr. Hollis Hatch a LEEP/CKC with final pathology consistent with CRISTINA-3 as per below. Dr. Hollis Hatch discussed ASCCP guidelines regarding CRISTINA-3 with recommendations for repeat pap/ECC in 4-6 months versus CKC. The patient presents for second opinion today to discuss possible hysterectomy. She reports a long history of abnormal pap smears, but reports this is the first procedure to her cervix. She is not desired in maintaining her fertility. She reports 2 prior vaginal deliveries. The patient is otherwise without complaints. Pertinent PMH/PSH: current everyday smoker      Active, no restrictions. Pathology Review:   5/15/19:   1. Portion of cervix at 6 o'clock: high-grade squamous intraepithelial neoplasia (7mm in greatest dimension). Dysplasia is 0.5mm from the endocervical margin and 0.8mm from the ectocervical margin. 2. Portion of anterior cervix, excision: hihg-grade squamous intraepithelial lesion (2.5mm in greatest dimension) extending to inked edge.    3. Anterior cervix at 12 o'clock, excision: high-grade squmous intraepithelial lesion (14mm in greatest dimension) extending to mucosal edge marked 12 o'clock. 4. Anterior cervix, excission: predominantly stroma with scant fragments of nondysplastic endocervical surface mucosa. 5. Endocervix, curetting: scant fragments of nondysplastic endocervical mucosa. ROS:  A comprehensive review of systems was negative except for that written in the History of Present Illness. , 10 point ROS    OB/GYN ROS:  Patient denies significant menstrual problems. ECOG stGstrstastdstest:st st1st Problem List:  Patient Active Problem List    Diagnosis Date Noted    CRISTINA III (cervical intraepithelial neoplasia grade III) with severe dysplasia 06/13/2019    Palpitations 08/31/2018    Dyslipidemia, goal LDL below 100 08/31/2018    Cardiac arrhythmia 08/31/2018    Tobacco dependency 10/01/2015    PROM (premature rupture of membranes) 04/13/2012    GBS (group B Streptococcus carrier), +RV culture, currently pregnant 04/13/2012     PMH:  Past Medical History:   Diagnosis Date    Abnormal Pap smear     HPV last pap. Will follow up after pregnancy    Anemia NEC     taking iron    Postpartum depression     Mild 3-4 months in duration      PSH:  Past Surgical History:   Procedure Laterality Date    HX GYN      2 children    HX LEEP PROCEDURE  05/15/2019    HGSIL, CRISTINA 2, CRISTINA 3    HX OTHER SURGICAL      ovarian cyst removed 1995    HX WISDOM TEETH EXTRACTION        Social History:  Social History     Tobacco Use    Smoking status: Current Every Day Smoker     Packs/day: 0.50     Years: 10.00     Pack years: 5.00    Smokeless tobacco: Never Used   Substance Use Topics    Alcohol use:  Yes     Alcohol/week: 0.0 standard drinks     Comment: rare      Family History:  Family History   Problem Relation Age of Onset    Hypertension Mother     Heart Disease Maternal Grandmother     Hypertension Maternal Grandmother     Cancer Maternal Grandfather         testicular, lung    Cancer Paternal Grandmother 54        Breast    Hypertension Paternal Grandmother     Cancer Paternal Grandfather         testicular, lung    MS Sister       Medications: (reviewed)  Current Outpatient Medications   Medication Sig    ibuprofen (MOTRIN) 200 mg tablet Take  by mouth.  albuterol (PROVENTIL HFA, VENTOLIN HFA, PROAIR HFA) 90 mcg/actuation inhaler Take 1 Puff by inhalation every six (6) hours as needed for Wheezing. No current facility-administered medications for this visit. Allergies: (reviewed)  No Known Allergies       OBJECTIVE:  Physical Exam:  Visit Vitals  /72 (BP 1 Location: Left arm, BP Patient Position: Sitting)   Pulse (!) 105   Ht 5' 0.5\" (1.537 m)   Wt 114 lb 3.2 oz (51.8 kg)   LMP 05/11/2020 (Approximate)   BMI 21.94 kg/m²      General: Alert and oriented. No acute distress. Well-nourished  HEENT: No thyroid enlargment. Neck supple without restrictions. Sclera normal. Normal occular motion. Moist mucous membranes. Lymphatics: No evidence of axillary, cervical, or subclavicular adenopathy. Respiratory: clear to auscultation and percussion to the bases. No CVAT. Cardiovascular: regular rate and rhythm. No murmurs, rubs, or gallops. Gastrointestinal: soft, non-tender, non-distended, no masses or organomegaly. Well-healed incision. Musculoskeletal: normal gait. No joint tenderness, deformity or swelling. No muscular tenderness. Extremities: extremities normal, atraumatic, no cyanosis or edema. Pelvic: exam chaperoned by nurse. Normal appearing external genitalia. She has a small 1-2cm area of condylomatous changes from 2-3 o'clock on the left vulva. On speculum exam, the vagina and cervix are normal appearing. On bimanual, the cervix is normal to palpation, and the uterus is normal size and mobile. Deferred rectovaginal exam. Pap smear collected. Neuro: Grossly intact. Normal gait and movement. No acute deficit  Skin: No evidence of rashes or skin changes. IMPRESSION/PLAN:    Ms. Jannet Stevenson is a 35 y.o. female with a working diagnosis of CRISTINA-3 s/p LEEP on 5/15/19 with positive ectocervical margins. Negative ECC. Repeat pap and ECC normal 9/2019. Problems:     Patient Active Problem List    Diagnosis Date Noted    CRISTINA III (cervical intraepithelial neoplasia grade III) with severe dysplasia 06/13/2019    Palpitations 08/31/2018    Dyslipidemia, goal LDL below 100 08/31/2018    Cardiac arrhythmia 08/31/2018    Tobacco dependency 10/01/2015    PROM (premature rupture of membranes) 04/13/2012    GBS (group B Streptococcus carrier), +RV culture, currently pregnant 04/13/2012     Reviewed patient's course to date. GILBERTO on exam. Repeat pap smear today. Reassured patient. 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 For now we will continue to monitor this area. Once the ongoing Covid-19 pandemic is under better control, we may consider surgical ablation of these areas. For now she will continue to observe them and we will treat if they worsen. RTC in 6 months for repeat pap smear. Reviewed precautionary symptoms to return sooner. All questions and concerns were addressed with the patient and she is comfortable with the plan.     Yudelka Shane MD

## 2020-05-20 NOTE — PROGRESS NOTES
6 month follow up for CRISTINA 3 , repeat cytology,  pt states no respiratory symptoms, no coughing, has not been around anyone who has tested positive for COVID 19, no travel within the last 30 days      1. Have you been to the ER, urgent care clinic since your last visit? Hospitalized since your last visit?  no    2. Have you seen or consulted any other health care providers outside of the [address] [place] [address] since your last visit? Include any pap smears or colon screening.    no

## 2020-11-18 ENCOUNTER — HOSPITAL ENCOUNTER (OUTPATIENT)
Dept: LAB | Age: 40
Discharge: HOME | End: 2020-11-18
Payer: COMMERCIAL

## 2020-11-18 ENCOUNTER — OFFICE VISIT (OUTPATIENT)
Dept: GYNECOLOGY | Age: 40
End: 2020-11-18
Payer: COMMERCIAL

## 2020-11-18 VITALS
DIASTOLIC BLOOD PRESSURE: 69 MMHG | WEIGHT: 111.6 LBS | BODY MASS INDEX: 21.91 KG/M2 | SYSTOLIC BLOOD PRESSURE: 104 MMHG | HEART RATE: 99 BPM | HEIGHT: 60 IN

## 2020-11-18 DIAGNOSIS — N93.9 ABNORMAL UTERINE BLEEDING (AUB): ICD-10-CM

## 2020-11-18 DIAGNOSIS — D06.9 CIN III (CERVICAL INTRAEPITHELIAL NEOPLASIA GRADE III) WITH SEVERE DYSPLASIA: Primary | ICD-10-CM

## 2020-11-18 DIAGNOSIS — N90.0 MILD VULVAR DYSPLASIA: ICD-10-CM

## 2020-11-18 DIAGNOSIS — A63.0 CONDYLOMA ACUMINATUM OF VULVA: ICD-10-CM

## 2020-11-18 DIAGNOSIS — F17.200 TOBACCO DEPENDENCY: ICD-10-CM

## 2020-11-18 PROCEDURE — 88142 CYTOPATH C/V THIN LAYER: CPT

## 2020-11-18 PROCEDURE — 99214 OFFICE O/P EST MOD 30 MIN: CPT | Performed by: OBSTETRICS & GYNECOLOGY

## 2020-11-18 NOTE — PROGRESS NOTES
6 month follow up for CRISTINA 3 ,  pt reports no abnormal spotting or bleeding, pt states she has no questions or concerns for today's visit    1. Have you been to the ER, urgent care clinic since your last visit? Hospitalized since your last visit?  no    2. Have you seen or consulted any other health care providers outside of the [address] [place] [address] since your last visit? Include any pap smears or colon screening.    no

## 2020-11-18 NOTE — PROGRESS NOTES
27 [facility] [address], [address]  P [phone]  F [phone]    Office Note  Patient ID:  Name:  Farrah Mcdaniel  MRN:  [id number]  :  1980/40 y.o. Date:  2020      HISTORY OF PRESENT ILLNESS:  Ms. Farrah Mcdaniel is a 38 y.o. female with CRISTINA-3 s/p LEEP on 5/15/19 with positive ectocervical margins. Negative ECC. F/u pap and ECC in 2019 were normal. Pap normal 2020. Presents back today for repeat pap smear. Reports intermenstrual spotting for the last few months. She though it may have been her cycle and her daughter's cycle trying to match up, but this does not seem to be the case. Her menses is otherwise regular. She is not sexually active. She reports a history of genital warts and she reports that she has been having a small outbreak around the time of her menses. Reports some itching. Initial History:  Ms. Farrah Mcdaniel is a 38 y.o.  premenopausal female who presents in consultation from Dr. Irma Diaz for high-grade cervical dysplasia (CRISTINA-3). On 5/15/19, the patient underwent with Dr. Irma Diaz a LEEP/CKC with final pathology consistent with CRISTINA-3 as per below. Dr. Irma Diaz discussed ASCCP guidelines regarding CRISTINA-3 with recommendations for repeat pap/ECC in 4-6 months versus CKC. The patient presents for second opinion today to discuss possible hysterectomy. She reports a long history of abnormal pap smears, but reports this is the first procedure to her cervix. She is not desired in maintaining her fertility. She reports 2 prior vaginal deliveries. The patient is otherwise without complaints. Pertinent PMH/PSH: current everyday smoker      Active, no restrictions. Pathology Review:   5/15/19:   1. Portion of cervix at 6 o'clock: high-grade squamous intraepithelial neoplasia (7mm in greatest dimension). Dysplasia is 0.5mm from the endocervical margin and 0.8mm from the ectocervical margin.    2. Portion of anterior cervix, excision: hihg-grade squamous intraepithelial lesion (2.5mm in greatest dimension) extending to inked edge. 3. Anterior cervix at 12 o'clock, excision: high-grade squmous intraepithelial lesion (14mm in greatest dimension) extending to mucosal edge marked 12 o'clock. 4. Anterior cervix, excission: predominantly stroma with scant fragments of nondysplastic endocervical surface mucosa. 5. Endocervix, curetting: scant fragments of nondysplastic endocervical mucosa. ROS:  A comprehensive review of systems was negative except for that written in the History of Present Illness. , 10 point ROS    OB/GYN ROS:  Patient denies significant menstrual problems. ECOG stGstrstastdstest:st st1st Problem List:  Patient Active Problem List    Diagnosis Date Noted    Condyloma acuminatum of vulva 11/18/2020    Mild vulvar dysplasia 11/18/2020    CRISTINA III (cervical intraepithelial neoplasia grade III) with severe dysplasia 06/13/2019    Palpitations 08/31/2018    Dyslipidemia, goal LDL below 100 08/31/2018    Cardiac arrhythmia 08/31/2018    Tobacco dependency 10/01/2015    PROM (premature rupture of membranes) 04/13/2012    GBS (group B Streptococcus carrier), +RV culture, currently pregnant 04/13/2012     PMH:  Past Medical History:   Diagnosis Date    Abnormal Pap smear     HPV last pap. Will follow up after pregnancy    Anemia NEC     taking iron    Postpartum depression     Mild 3-4 months in duration      PSH:  Past Surgical History:   Procedure Laterality Date    HX GYN      2 children    HX LEEP PROCEDURE  05/15/2019    HGSIL, RCISTINA 2, CRISTINA 3    HX OTHER SURGICAL      ovarian cyst removed 1995    HX WISDOM TEETH EXTRACTION        Social History:  Social History     Tobacco Use    Smoking status: Current Every Day Smoker     Packs/day: 0.50     Years: 10.00     Pack years: 5.00    Smokeless tobacco: Never Used   Substance Use Topics    Alcohol use:  Yes     Alcohol/week: 0.0 standard drinks Comment: rare      Family History:  Family History   Problem Relation Age of Onset    Hypertension Mother     Heart Disease Maternal Grandmother     Hypertension Maternal Grandmother     Cancer Maternal Grandfather         testicular, lung    Cancer Paternal Grandmother 54        Breast    Hypertension Paternal Grandmother     Cancer Paternal Grandfather         testicular, lung    MS Sister       Medications: (reviewed)  Current Outpatient Medications   Medication Sig    ibuprofen (MOTRIN) 200 mg tablet Take  by mouth.  albuterol (PROVENTIL HFA, VENTOLIN HFA, PROAIR HFA) 90 mcg/actuation inhaler Take 1 Puff by inhalation every six (6) hours as needed for Wheezing. No current facility-administered medications for this visit. Allergies: (reviewed)  No Known Allergies       OBJECTIVE:  Physical Exam:  Visit Vitals  /69 (BP 1 Location: Left arm, BP Patient Position: Sitting)   Pulse 99   Ht 5' (1.524 m)   Wt 111 lb 9.6 oz (50.6 kg)   LMP 11/06/2020   BMI 21.80 kg/m²      General: Alert and oriented. No acute distress. Well-nourished  HEENT: No thyroid enlargment. Neck supple without restrictions. Sclera normal. Normal occular motion. Moist mucous membranes. Lymphatics: No evidence of axillary, cervical, or subclavicular adenopathy. Respiratory: clear to auscultation and percussion to the bases. No CVAT. Cardiovascular: regular rate and rhythm. No murmurs, rubs, or gallops. Gastrointestinal: soft, non-tender, non-distended, no masses or organomegaly. Well-healed incision. Musculoskeletal: normal gait. No joint tenderness, deformity or swelling. No muscular tenderness. Extremities: extremities normal, atraumatic, no cyanosis or edema. Pelvic: exam chaperoned by nurse. Normal appearing external genitalia. She has a small 1-2cm area of condylomatous changes to mild dysplasia from 9-11 and 2-3 o'clock on the anterior labia majora.  On speculum exam, the vagina and cervix are normal appearing. On bimanual, the cervix is normal to palpation, and the uterus is normal size and mobile. Deferred rectovaginal exam. Pap smear collected. Neuro: Grossly intact. Normal gait and movement. No acute deficit  Skin: No evidence of rashes or skin changes. IMPRESSION/PLAN:    Ms. Gualberto Lyn is a 38 y.o. female with a working diagnosis of CRISTINA-3 s/p LEEP on 5/15/19 with positive ectocervical margins. Negative ECC. Repeat pap and ECC normal 9/2019. Pap normal 5/2020. Problems:     Patient Active Problem List    Diagnosis Date Noted    Condyloma acuminatum of vulva 11/18/2020    Mild vulvar dysplasia 11/18/2020    CRISTINA III (cervical intraepithelial neoplasia grade III) with severe dysplasia 06/13/2019    Palpitations 08/31/2018    Dyslipidemia, goal LDL below 100 08/31/2018    Cardiac arrhythmia 08/31/2018    Tobacco dependency 10/01/2015    PROM (premature rupture of membranes) 04/13/2012    GBS (group B Streptococcus carrier), +RV culture, currently pregnant 04/13/2012     Reviewed patient's course to date. GILBERTO on exam. Repeat pap smear today. Reassured patient. In regard to her condyloma, she appears to have some mild dysplasia along her labia majora on each side. I have recommended EUA, vulvar colposcopy with biopsies, and laser ablation of this area. Also, in regard to her intermenstrual spotting, I have recommended hysteroscopy/D&C at the same time as her vulvar ablation. I offered an EMB in clinic, but we can do this all while under anesthesia. Will plan surgery in the beginning of 2021 per patient's convenience. Reviewed risks, benefits, indications, and alternatives of surgery. Will collect CBCD and CMP prior to surgery. All questions and concerns were addressed with the patient and she is comfortable with the plan.     Nona Murphy MD

## 2021-08-10 NOTE — PROGRESS NOTES
One year follow up for CRISTINA 3, pt reports abdominal pain x 1 week \"inflammation feeling\", spotting X 3 days last week    1. Have you been to the ER, urgent care clinic since your last visit? Hospitalized since your last visit?  no    2. Have you seen or consulted any other health care providers outside of the [address] [place] [address] since your last visit? Include any pap smears or colon screening.    no

## 2021-08-11 ENCOUNTER — OFFICE VISIT (OUTPATIENT)
Dept: GYNECOLOGY | Age: 41
End: 2021-08-11
Payer: COMMERCIAL

## 2021-08-11 VITALS
SYSTOLIC BLOOD PRESSURE: 133 MMHG | DIASTOLIC BLOOD PRESSURE: 81 MMHG | HEART RATE: 112 BPM | WEIGHT: 111.2 LBS | HEIGHT: 60 IN | BODY MASS INDEX: 21.83 KG/M2

## 2021-08-11 DIAGNOSIS — N90.0 MILD VULVAR DYSPLASIA: ICD-10-CM

## 2021-08-11 DIAGNOSIS — R10.2 PELVIC PAIN: Primary | ICD-10-CM

## 2021-08-11 DIAGNOSIS — A63.0 CONDYLOMA ACUMINATUM OF VULVA: ICD-10-CM

## 2021-08-11 DIAGNOSIS — D06.9 CIN III (CERVICAL INTRAEPITHELIAL NEOPLASIA GRADE III) WITH SEVERE DYSPLASIA: ICD-10-CM

## 2021-08-11 DIAGNOSIS — N93.9 ABNORMAL UTERINE BLEEDING (AUB): ICD-10-CM

## 2021-08-11 PROCEDURE — 99214 OFFICE O/P EST MOD 30 MIN: CPT | Performed by: OBSTETRICS & GYNECOLOGY

## 2021-08-11 NOTE — PROGRESS NOTES
27 [facility] [address], [address] [facility]  P [phone]  F [phone]    Office Note  Patient ID:  Name:  Naz Posey  MRN:  [id number]  :  1980/40 y.o. Date:  2021      HISTORY OF PRESENT ILLNESS:  Ms. Naz Posey is a 38 y.o. female with CRISTINA-3 s/p LEEP on 5/15/19 with positive ectocervical margins. Negative ECC. F/u pap and ECC in 2019 were normal. Pap normal 2020, 2020. Presents back today for repeat pap smear. Last seen in 2020. At that time she had reported intermenstrual spotting for the last few months, which has continued. Originally we had scheduled a surgery, but this has been delayed because of Covid and insurance issues. She is now ready to proceed with surgery. She continues to report the vulvar itching. She reports a lesion on the left labia that has become more bothersome. Initial History:  Ms. Naz Posey is a 38 y.o.  premenopausal female who presents in consultation from Dr. Danita Mares for high-grade cervical dysplasia (CRISTINA-3). On 5/15/19, the patient underwent with Dr. Danita Mares a LEEP/CKC with final pathology consistent with CRISTINA-3 as per below. Dr. Danita Mares discussed ASCCP guidelines regarding CRISTINA-3 with recommendations for repeat pap/ECC in 4-6 months versus CKC. The patient presents for second opinion today to discuss possible hysterectomy. She reports a long history of abnormal pap smears, but reports this is the first procedure to her cervix. She is not desired in maintaining her fertility. She reports 2 prior vaginal deliveries. The patient is otherwise without complaints. Pertinent PMH/PSH: current everyday smoker      Active, no restrictions. Pathology Review:   5/15/19:   1. Portion of cervix at 6 o'clock: high-grade squamous intraepithelial neoplasia (7mm in greatest dimension). Dysplasia is 0.5mm from the endocervical margin and 0.8mm from the ectocervical margin.    2. Portion of anterior cervix, excision: hihg-grade squamous intraepithelial lesion (2.5mm in greatest dimension) extending to inked edge. 3. Anterior cervix at 12 o'clock, excision: high-grade squmous intraepithelial lesion (14mm in greatest dimension) extending to mucosal edge marked 12 o'clock. 4. Anterior cervix, excission: predominantly stroma with scant fragments of nondysplastic endocervical surface mucosa. 5. Endocervix, curetting: scant fragments of nondysplastic endocervical mucosa. ROS:  A comprehensive review of systems was negative except for that written in the History of Present Illness. , 10 point ROS    OB/GYN ROS:  Patient denies significant menstrual problems. ECOG stGstrstastdstest:st st1st Problem List:  Patient Active Problem List    Diagnosis Date Noted    Condyloma acuminatum of vulva 11/18/2020    Mild vulvar dysplasia 11/18/2020    Abnormal uterine bleeding (AUB) 11/18/2020    CRISTINA III (cervical intraepithelial neoplasia grade III) with severe dysplasia 06/13/2019    Palpitations 08/31/2018    Dyslipidemia, goal LDL below 100 08/31/2018    Cardiac arrhythmia 08/31/2018    Tobacco dependency 10/01/2015    PROM (premature rupture of membranes) 04/13/2012    GBS (group B Streptococcus carrier), +RV culture, currently pregnant 04/13/2012     PMH:  Past Medical History:   Diagnosis Date    Abnormal Pap smear     HPV last pap. Will follow up after pregnancy    Anemia NEC     taking iron    Postpartum depression     Mild 3-4 months in duration      PSH:  Past Surgical History:   Procedure Laterality Date    HX GYN      2 children    HX LEEP PROCEDURE  05/15/2019    HGSIL, CRISTINA 2, CRISTINA 3    HX OTHER SURGICAL      ovarian cyst removed 1995    HX WISDOM TEETH EXTRACTION        Social History:  Social History     Tobacco Use    Smoking status: Current Every Day Smoker     Packs/day: 0.50     Years: 10.00     Pack years: 5.00    Smokeless tobacco: Never Used   Substance Use Topics    Alcohol use:  Yes Alcohol/week: 0.0 standard drinks     Comment: rare      Family History:  Family History   Problem Relation Age of Onset    Hypertension Mother     Heart Disease Maternal Grandmother     Hypertension Maternal Grandmother     Cancer Maternal Grandfather         testicular, lung    Cancer Paternal Grandmother 54        Breast    Hypertension Paternal Grandmother     Cancer Paternal Grandfather         testicular, lung    MS Sister       Medications: (reviewed)  Current Outpatient Medications   Medication Sig    ibuprofen (MOTRIN) 200 mg tablet Take  by mouth.  albuterol (PROVENTIL HFA, VENTOLIN HFA, PROAIR HFA) 90 mcg/actuation inhaler Take 1 Puff by inhalation every six (6) hours as needed for Wheezing. No current facility-administered medications for this visit. Allergies: (reviewed)  No Known Allergies       OBJECTIVE:  Physical Exam:  Visit Vitals  /81 (BP 1 Location: Left arm, BP Patient Position: Sitting)   Pulse (!) 112   Ht 5' (1.524 m)   Wt 111 lb 3.2 oz (50.4 kg)   LMP 07/18/2021 (Exact Date)   BMI 21.72 kg/m²      General: Alert and oriented. No acute distress. Well-nourished  HEENT: No thyroid enlargment. Neck supple without restrictions. Sclera normal. Normal occular motion. Moist mucous membranes. Lymphatics: No evidence of axillary, cervical, or subclavicular adenopathy. Respiratory: clear to auscultation and percussion to the bases. No CVAT. Cardiovascular: regular rate and rhythm. No murmurs, rubs, or gallops. Gastrointestinal: soft, non-tender, non-distended, no masses or organomegaly. Well-healed incision. Musculoskeletal: normal gait. No joint tenderness, deformity or swelling. No muscular tenderness. Extremities: extremities normal, atraumatic, no cyanosis or edema. Pelvic: exam chaperoned by nurse. Normal appearing external genitalia.  She has a small 1-2cm area of condylomatous changes to mild dysplasia from 9-11 and 2-3 o'clock on the anterior labia majora. On speculum exam, the vagina and cervix are normal appearing. On bimanual, the cervix is normal to palpation, and the uterus is normal size and mobile. Deferred rectovaginal exam. Pap smear collected. Neuro: Grossly intact. Normal gait and movement. No acute deficit  Skin: No evidence of rashes or skin changes. IMPRESSION/PLAN:    Ms. Christa Patel is a 38 y.o. female with a working diagnosis of CRISTINA-3 s/p LEEP on 5/15/19 with positive ectocervical margins. Negative ECC. Repeat pap and ECC normal 9/2019. Pap normal 5/2020. Problems:     Patient Active Problem List    Diagnosis Date Noted    Condyloma acuminatum of vulva 11/18/2020    Mild vulvar dysplasia 11/18/2020    Abnormal uterine bleeding (AUB) 11/18/2020    CRISTINA III (cervical intraepithelial neoplasia grade III) with severe dysplasia 06/13/2019    Palpitations 08/31/2018    Dyslipidemia, goal LDL below 100 08/31/2018    Cardiac arrhythmia 08/31/2018    Tobacco dependency 10/01/2015    PROM (premature rupture of membranes) 04/13/2012    GBS (group B Streptococcus carrier), +RV culture, currently pregnant 04/13/2012     Reviewed patient's course to date. GILBERTO on exam. Repeat pap smear today. Reassured patient. In regard to her condyloma, she appears to have some mild dysplasia along her labia majora on each side. The patient is now ready to proceed with surgery. I have recommended EUA, vulvar colposcopy with biopsies, and laser ablation of this area. Also, in regard to her intermenstrual spotting, I have recommended hysteroscopy/D&C at the same time as her vulvar ablation. Reviewed risks, benefits, indications, and alternatives of surgery. Will collect CBCD and CMP prior to surgery. Will post in September per the patient's request. All questions and concerns were addressed with the patient and she is comfortable with the plan. An electronic signature was used to authenticate this note.      Armando Bloom MD

## 2021-08-12 ENCOUNTER — HOSPITAL ENCOUNTER (OUTPATIENT)
Dept: LAB | Age: 41
Discharge: HOME | End: 2021-08-12
Payer: COMMERCIAL

## 2021-08-12 PROCEDURE — 87624 HPV HI-RISK TYP POOLED RSLT: CPT

## 2021-08-19 LAB
CYTOLOGIST CVX/VAG CYTO: NORMAL
CYTOLOGY CVX/VAG DOC THIN PREP: NORMAL
HPV APTIMA: NEGATIVE
Lab: NORMAL
PATH REPORT.FINAL DX SPEC: NORMAL
STAT OF ADQ CVX/VAG CYTO-IMP: NORMAL

## 2021-08-25 ENCOUNTER — HOSPITAL ENCOUNTER (OUTPATIENT)
Dept: ULTRASOUND IMAGING | Age: 41
Discharge: HOME | End: 2021-08-25
Attending: OBSTETRICS & GYNECOLOGY
Payer: COMMERCIAL

## 2021-08-25 DIAGNOSIS — R10.2 PELVIC PAIN: ICD-10-CM

## 2021-08-25 PROCEDURE — 76830 TRANSVAGINAL US NON-OB: CPT

## 2021-08-25 PROCEDURE — 76856 US EXAM PELVIC COMPLETE: CPT

## 2021-09-03 ENCOUNTER — HOSPITAL ENCOUNTER (OUTPATIENT)
Dept: PREADMISSION TESTING | Age: 41
Discharge: HOME | End: 2021-09-03
Payer: COMMERCIAL

## 2021-09-03 VITALS
BODY MASS INDEX: 20.77 KG/M2 | RESPIRATION RATE: 18 BRPM | HEIGHT: 61 IN | TEMPERATURE: 98.9 F | SYSTOLIC BLOOD PRESSURE: 123 MMHG | DIASTOLIC BLOOD PRESSURE: 78 MMHG | HEART RATE: 86 BPM | WEIGHT: 110.01 LBS

## 2021-09-03 LAB
ALBUMIN SERPL-MCNC: 4 G/DL (ref 3.5–5)
ALBUMIN/GLOB SERPL: 1.3 {RATIO} (ref 1.1–2.2)
ALP SERPL-CCNC: 64 U/L (ref 45–117)
ALT SERPL-CCNC: 16 U/L (ref 12–78)
ANION GAP SERPL CALC-SCNC: 4 MMOL/L (ref 5–15)
AST SERPL-CCNC: 15 U/L (ref 15–37)
BASOPHILS # BLD: 0.2 K/UL (ref 0–0.1)
BASOPHILS NFR BLD: 2 % (ref 0–1)
BILIRUB SERPL-MCNC: 0.3 MG/DL (ref 0.2–1)
BUN SERPL-MCNC: 8 MG/DL (ref 6–20)
BUN/CREAT SERPL: 11 (ref 12–20)
CALCIUM SERPL-MCNC: 8.9 MG/DL (ref 8.5–10.1)
CHLORIDE SERPL-SCNC: 108 MMOL/L (ref 97–108)
CO2 SERPL-SCNC: 28 MMOL/L (ref 21–32)
CREAT SERPL-MCNC: 0.72 MG/DL (ref 0.55–1.02)
DIFFERENTIAL METHOD BLD: ABNORMAL
EOSINOPHIL # BLD: 0 K/UL (ref 0–0.4)
EOSINOPHIL NFR BLD: 0 % (ref 0–7)
ERYTHROCYTE [DISTWIDTH] IN BLOOD BY AUTOMATED COUNT: 13.7 % (ref 11.5–14.5)
GLOBULIN SER CALC-MCNC: 3 G/DL (ref 2–4)
GLUCOSE SERPL-MCNC: 87 MG/DL (ref 65–100)
HCT VFR BLD AUTO: 37.7 % (ref 35–47)
HGB BLD-MCNC: 12.3 G/DL (ref 11.5–16)
IMM GRANULOCYTES # BLD AUTO: 0 K/UL
IMM GRANULOCYTES NFR BLD AUTO: 0 %
LYMPHOCYTES # BLD: 4.2 K/UL (ref 0.8–3.5)
LYMPHOCYTES NFR BLD: 48 % (ref 12–49)
MCH RBC QN AUTO: 30.1 PG (ref 26–34)
MCHC RBC AUTO-ENTMCNC: 32.6 G/DL (ref 30–36.5)
MCV RBC AUTO: 92.2 FL (ref 80–99)
MONOCYTES # BLD: 0.4 K/UL (ref 0–1)
MONOCYTES NFR BLD: 4 % (ref 5–13)
NEUTS SEG # BLD: 4.1 K/UL (ref 1.8–8)
NEUTS SEG NFR BLD: 46 % (ref 32–75)
NRBC # BLD: 0 K/UL (ref 0–0.01)
NRBC BLD-RTO: 0 PER 100 WBC
PLATELET # BLD AUTO: 228 K/UL (ref 150–400)
PMV BLD AUTO: 10.4 FL (ref 8.9–12.9)
POTASSIUM SERPL-SCNC: 3.9 MMOL/L (ref 3.5–5.1)
PROT SERPL-MCNC: 7 G/DL (ref 6.4–8.2)
RBC # BLD AUTO: 4.09 M/UL (ref 3.8–5.2)
RBC MORPH BLD: ABNORMAL
SODIUM SERPL-SCNC: 140 MMOL/L (ref 136–145)
WBC # BLD AUTO: 8.9 K/UL (ref 3.6–11)

## 2021-09-03 PROCEDURE — 85025 COMPLETE CBC W/AUTO DIFF WBC: CPT

## 2021-09-03 PROCEDURE — 80053 COMPREHEN METABOLIC PANEL: CPT

## 2021-09-03 PROCEDURE — 36415 COLL VENOUS BLD VENIPUNCTURE: CPT

## 2021-09-03 NOTE — PERIOP NOTES
PRE-OPERATIVE INSTRUCTIONS REVIEWED WITH PATIENT. PT GIVEN TIME TO ASK QUESTION   PATIENT GIVEN 2-BOTTLE OF CHG SOAP. REVIEWED  PATIENT GIVEN SSI INFECTION FAQ SHEET.     INSTRUCTED ON ADMISSION PROCESS       INSTRUCTED TO [facility]

## 2021-09-07 ENCOUNTER — HOSPITAL ENCOUNTER (OUTPATIENT)
Dept: PREADMISSION TESTING | Age: 41
Discharge: HOME | End: 2021-09-07
Payer: COMMERCIAL

## 2021-09-07 ENCOUNTER — TRANSCRIBE ORDER (OUTPATIENT)
Dept: REGISTRATION | Age: 41
End: 2021-09-07

## 2021-09-07 DIAGNOSIS — Z01.812 PRE-PROCEDURE LAB EXAM: Primary | ICD-10-CM

## 2021-09-07 DIAGNOSIS — Z01.812 PRE-PROCEDURE LAB EXAM: ICD-10-CM

## 2021-09-07 PROCEDURE — U0005 INFEC AGEN DETEC AMPLI PROBE: HCPCS

## 2021-09-07 PROCEDURE — 36415 COLL VENOUS BLD VENIPUNCTURE: CPT

## 2021-09-08 LAB
SARS-COV-2, XPLCVT: NOT DETECTED
SOURCE, COVRS: NORMAL

## 2021-09-09 RX ORDER — SODIUM CHLORIDE 0.9 % (FLUSH) 0.9 %
5-40 SYRINGE (ML) INJECTION AS NEEDED
Status: CANCELLED | OUTPATIENT
Start: 2021-09-09

## 2021-09-09 RX ORDER — MORPHINE SULFATE 2 MG/ML
2 INJECTION, SOLUTION INTRAMUSCULAR; INTRAVENOUS
Status: CANCELLED | OUTPATIENT
Start: 2021-09-09

## 2021-09-09 RX ORDER — HYDROMORPHONE HYDROCHLORIDE 1 MG/ML
0.2 INJECTION, SOLUTION INTRAMUSCULAR; INTRAVENOUS; SUBCUTANEOUS
Status: CANCELLED | OUTPATIENT
Start: 2021-09-09

## 2021-09-09 RX ORDER — DIPHENHYDRAMINE HYDROCHLORIDE 50 MG/ML
12.5 INJECTION, SOLUTION INTRAMUSCULAR; INTRAVENOUS AS NEEDED
Status: CANCELLED | OUTPATIENT
Start: 2021-09-09 | End: 2021-09-09

## 2021-09-09 RX ORDER — FENTANYL CITRATE 50 UG/ML
25 INJECTION, SOLUTION INTRAMUSCULAR; INTRAVENOUS
Status: CANCELLED | OUTPATIENT
Start: 2021-09-09

## 2021-09-09 RX ORDER — EPHEDRINE SULFATE/0.9% NACL/PF 50 MG/5 ML
5 SYRINGE (ML) INTRAVENOUS AS NEEDED
Status: CANCELLED | OUTPATIENT
Start: 2021-09-09

## 2021-09-09 RX ORDER — OXYCODONE AND ACETAMINOPHEN 5; 325 MG/1; MG/1
1 TABLET ORAL AS NEEDED
Status: CANCELLED | OUTPATIENT
Start: 2021-09-09

## 2021-09-09 RX ORDER — SODIUM CHLORIDE 9 MG/ML
1000 INJECTION, SOLUTION INTRAVENOUS CONTINUOUS
Status: CANCELLED | OUTPATIENT
Start: 2021-09-09

## 2021-09-09 RX ORDER — MIDAZOLAM HYDROCHLORIDE 1 MG/ML
0.5 INJECTION, SOLUTION INTRAMUSCULAR; INTRAVENOUS
Status: CANCELLED | OUTPATIENT
Start: 2021-09-09

## 2021-09-09 RX ORDER — SODIUM CHLORIDE, SODIUM LACTATE, POTASSIUM CHLORIDE, CALCIUM CHLORIDE 600; 310; 30; 20 MG/100ML; MG/100ML; MG/100ML; MG/100ML
125 INJECTION, SOLUTION INTRAVENOUS CONTINUOUS
Status: CANCELLED | OUTPATIENT
Start: 2021-09-09

## 2021-09-09 RX ORDER — ONDANSETRON 2 MG/ML
4 INJECTION INTRAMUSCULAR; INTRAVENOUS AS NEEDED
Status: CANCELLED | OUTPATIENT
Start: 2021-09-09

## 2021-09-09 RX ORDER — SODIUM CHLORIDE 0.9 % (FLUSH) 0.9 %
5-40 SYRINGE (ML) INJECTION EVERY 8 HOURS
Status: CANCELLED | OUTPATIENT
Start: 2021-09-09

## 2021-09-10 ENCOUNTER — HOSPITAL ENCOUNTER (OUTPATIENT)
Age: 41
Setting detail: OUTPATIENT SURGERY
Discharge: HOME | End: 2021-09-10
Attending: OBSTETRICS & GYNECOLOGY | Admitting: OBSTETRICS & GYNECOLOGY
Payer: COMMERCIAL

## 2021-09-10 ENCOUNTER — ANESTHESIA (OUTPATIENT)
Dept: SURGERY | Age: 41
End: 2021-09-10
Payer: COMMERCIAL

## 2021-09-10 ENCOUNTER — ANESTHESIA EVENT (OUTPATIENT)
Dept: SURGERY | Age: 41
End: 2021-09-10
Payer: COMMERCIAL

## 2021-09-10 VITALS
OXYGEN SATURATION: 96 % | DIASTOLIC BLOOD PRESSURE: 71 MMHG | SYSTOLIC BLOOD PRESSURE: 108 MMHG | TEMPERATURE: 98 F | HEIGHT: 61 IN | HEART RATE: 74 BPM | BODY MASS INDEX: 20.77 KG/M2 | WEIGHT: 110 LBS | RESPIRATION RATE: 19 BRPM

## 2021-09-10 LAB — HCG UR QL: NEGATIVE

## 2021-09-10 PROCEDURE — 2709999900 HC NON-CHARGEABLE SUPPLY: Performed by: OBSTETRICS & GYNECOLOGY

## 2021-09-10 PROCEDURE — 77030040922 HC BLNKT HYPOTHRM STRY -A

## 2021-09-10 PROCEDURE — 77030016378 HC TBNG SMK EVAC SRGM -A: Performed by: OBSTETRICS & GYNECOLOGY

## 2021-09-10 PROCEDURE — 88305 TISSUE EXAM BY PATHOLOGIST: CPT

## 2021-09-10 PROCEDURE — 76210000020 HC REC RM PH II FIRST 0.5 HR: Performed by: OBSTETRICS & GYNECOLOGY

## 2021-09-10 PROCEDURE — 74011000250 HC RX REV CODE- 250: Performed by: NURSE ANESTHETIST, CERTIFIED REGISTERED

## 2021-09-10 PROCEDURE — 74011250636 HC RX REV CODE- 250/636: Performed by: ANESTHESIOLOGY

## 2021-09-10 PROCEDURE — 77030019905 HC CATH URETH INTMIT MDII -A: Performed by: OBSTETRICS & GYNECOLOGY

## 2021-09-10 PROCEDURE — 74011250637 HC RX REV CODE- 250/637: Performed by: ANESTHESIOLOGY

## 2021-09-10 PROCEDURE — 74011250636 HC RX REV CODE- 250/636: Performed by: NURSE ANESTHETIST, CERTIFIED REGISTERED

## 2021-09-10 PROCEDURE — 76060000033 HC ANESTHESIA 1 TO 1.5 HR: Performed by: OBSTETRICS & GYNECOLOGY

## 2021-09-10 PROCEDURE — 76010000161 HC OR TIME 1 TO 1.5 HR INTENSV-TIER 1: Performed by: OBSTETRICS & GYNECOLOGY

## 2021-09-10 PROCEDURE — 77030033650 HC DEV TISS RMVL MYOSUR HOLO -F: Performed by: OBSTETRICS & GYNECOLOGY

## 2021-09-10 PROCEDURE — 77030041423 HC SYST FLUID MNGMT FLUENT HOLO -D: Performed by: OBSTETRICS & GYNECOLOGY

## 2021-09-10 PROCEDURE — 76210000016 HC OR PH I REC 1 TO 1.5 HR: Performed by: OBSTETRICS & GYNECOLOGY

## 2021-09-10 PROCEDURE — 74011000250 HC RX REV CODE- 250: Performed by: OBSTETRICS & GYNECOLOGY

## 2021-09-10 PROCEDURE — 81025 URINE PREGNANCY TEST: CPT

## 2021-09-10 PROCEDURE — 77030040361 HC SLV COMPR DVT MDII -B: Performed by: OBSTETRICS & GYNECOLOGY

## 2021-09-10 PROCEDURE — 77030010509 HC AIRWY LMA MSK TELE -A: Performed by: ANESTHESIOLOGY

## 2021-09-10 RX ORDER — SODIUM CHLORIDE 0.9 % (FLUSH) 0.9 %
5-40 SYRINGE (ML) INJECTION EVERY 8 HOURS
Status: DISCONTINUED | OUTPATIENT
Start: 2021-09-10 | End: 2021-09-10 | Stop reason: HOSPADM

## 2021-09-10 RX ORDER — ONDANSETRON 2 MG/ML
INJECTION INTRAMUSCULAR; INTRAVENOUS AS NEEDED
Status: DISCONTINUED | OUTPATIENT
Start: 2021-09-10 | End: 2021-09-10 | Stop reason: HOSPADM

## 2021-09-10 RX ORDER — SODIUM CHLORIDE 9 MG/ML
50 INJECTION, SOLUTION INTRAVENOUS CONTINUOUS
Status: DISCONTINUED | OUTPATIENT
Start: 2021-09-10 | End: 2021-09-10 | Stop reason: HOSPADM

## 2021-09-10 RX ORDER — FENTANYL CITRATE 50 UG/ML
50 INJECTION, SOLUTION INTRAMUSCULAR; INTRAVENOUS AS NEEDED
Status: DISCONTINUED | OUTPATIENT
Start: 2021-09-10 | End: 2021-09-10 | Stop reason: HOSPADM

## 2021-09-10 RX ORDER — PROPOFOL 10 MG/ML
INJECTION, EMULSION INTRAVENOUS AS NEEDED
Status: DISCONTINUED | OUTPATIENT
Start: 2021-09-10 | End: 2021-09-10 | Stop reason: HOSPADM

## 2021-09-10 RX ORDER — MIDAZOLAM HYDROCHLORIDE 1 MG/ML
1 INJECTION, SOLUTION INTRAMUSCULAR; INTRAVENOUS AS NEEDED
Status: DISCONTINUED | OUTPATIENT
Start: 2021-09-10 | End: 2021-09-10 | Stop reason: HOSPADM

## 2021-09-10 RX ORDER — FENTANYL CITRATE 50 UG/ML
INJECTION, SOLUTION INTRAMUSCULAR; INTRAVENOUS AS NEEDED
Status: DISCONTINUED | OUTPATIENT
Start: 2021-09-10 | End: 2021-09-10 | Stop reason: HOSPADM

## 2021-09-10 RX ORDER — ACETAMINOPHEN 325 MG/1
650 TABLET ORAL ONCE
Status: COMPLETED | OUTPATIENT
Start: 2021-09-10 | End: 2021-09-10

## 2021-09-10 RX ORDER — LIDOCAINE HYDROCHLORIDE 10 MG/ML
0.1 INJECTION, SOLUTION EPIDURAL; INFILTRATION; INTRACAUDAL; PERINEURAL AS NEEDED
Status: DISCONTINUED | OUTPATIENT
Start: 2021-09-10 | End: 2021-09-10 | Stop reason: HOSPADM

## 2021-09-10 RX ORDER — SODIUM CHLORIDE, SODIUM LACTATE, POTASSIUM CHLORIDE, CALCIUM CHLORIDE 600; 310; 30; 20 MG/100ML; MG/100ML; MG/100ML; MG/100ML
125 INJECTION, SOLUTION INTRAVENOUS CONTINUOUS
Status: DISCONTINUED | OUTPATIENT
Start: 2021-09-10 | End: 2021-09-10 | Stop reason: HOSPADM

## 2021-09-10 RX ORDER — DEXMEDETOMIDINE HYDROCHLORIDE 100 UG/ML
INJECTION, SOLUTION INTRAVENOUS AS NEEDED
Status: DISCONTINUED | OUTPATIENT
Start: 2021-09-10 | End: 2021-09-10 | Stop reason: HOSPADM

## 2021-09-10 RX ORDER — KETOROLAC TROMETHAMINE 30 MG/ML
INJECTION, SOLUTION INTRAMUSCULAR; INTRAVENOUS AS NEEDED
Status: DISCONTINUED | OUTPATIENT
Start: 2021-09-10 | End: 2021-09-10 | Stop reason: HOSPADM

## 2021-09-10 RX ORDER — DEXAMETHASONE SODIUM PHOSPHATE 4 MG/ML
INJECTION, SOLUTION INTRA-ARTICULAR; INTRALESIONAL; INTRAMUSCULAR; INTRAVENOUS; SOFT TISSUE AS NEEDED
Status: DISCONTINUED | OUTPATIENT
Start: 2021-09-10 | End: 2021-09-10 | Stop reason: HOSPADM

## 2021-09-10 RX ORDER — SILVER NITRATE 38.21; 12.74 MG/1; MG/1
STICK TOPICAL AS NEEDED
Status: DISCONTINUED | OUTPATIENT
Start: 2021-09-10 | End: 2021-09-10 | Stop reason: HOSPADM

## 2021-09-10 RX ORDER — LIDOCAINE HYDROCHLORIDE 10 MG/ML
INJECTION INFILTRATION; PERINEURAL AS NEEDED
Status: DISCONTINUED | OUTPATIENT
Start: 2021-09-10 | End: 2021-09-10 | Stop reason: HOSPADM

## 2021-09-10 RX ORDER — SODIUM CHLORIDE 0.9 % (FLUSH) 0.9 %
5-40 SYRINGE (ML) INJECTION AS NEEDED
Status: DISCONTINUED | OUTPATIENT
Start: 2021-09-10 | End: 2021-09-10 | Stop reason: HOSPADM

## 2021-09-10 RX ORDER — LIDOCAINE HYDROCHLORIDE 20 MG/ML
INJECTION, SOLUTION EPIDURAL; INFILTRATION; INTRACAUDAL; PERINEURAL AS NEEDED
Status: DISCONTINUED | OUTPATIENT
Start: 2021-09-10 | End: 2021-09-10 | Stop reason: HOSPADM

## 2021-09-10 RX ORDER — MIDAZOLAM HYDROCHLORIDE 1 MG/ML
INJECTION, SOLUTION INTRAMUSCULAR; INTRAVENOUS AS NEEDED
Status: DISCONTINUED | OUTPATIENT
Start: 2021-09-10 | End: 2021-09-10 | Stop reason: HOSPADM

## 2021-09-10 RX ADMIN — DEXAMETHASONE SODIUM PHOSPHATE 8 MG: 4 INJECTION, SOLUTION INTRAMUSCULAR; INTRAVENOUS at 09:37

## 2021-09-10 RX ADMIN — PROPOFOL 50 MG: 10 INJECTION, EMULSION INTRAVENOUS at 09:32

## 2021-09-10 RX ADMIN — DEXMEDETOMIDINE HYDROCHLORIDE 4 MCG: 100 INJECTION, SOLUTION, CONCENTRATE INTRAVENOUS at 09:59

## 2021-09-10 RX ADMIN — DEXMEDETOMIDINE HYDROCHLORIDE 4 MCG: 100 INJECTION, SOLUTION, CONCENTRATE INTRAVENOUS at 10:01

## 2021-09-10 RX ADMIN — LIDOCAINE HYDROCHLORIDE 80 MG: 20 INJECTION, SOLUTION EPIDURAL; INFILTRATION; INTRACAUDAL; PERINEURAL at 09:29

## 2021-09-10 RX ADMIN — FENTANYL CITRATE 50 MCG: 50 INJECTION, SOLUTION INTRAMUSCULAR; INTRAVENOUS at 10:00

## 2021-09-10 RX ADMIN — DEXMEDETOMIDINE HYDROCHLORIDE 4 MCG: 100 INJECTION, SOLUTION, CONCENTRATE INTRAVENOUS at 10:06

## 2021-09-10 RX ADMIN — PROPOFOL 150 MG: 10 INJECTION, EMULSION INTRAVENOUS at 09:29

## 2021-09-10 RX ADMIN — PROPOFOL 50 MG: 10 INJECTION, EMULSION INTRAVENOUS at 09:59

## 2021-09-10 RX ADMIN — PROPOFOL 50 MG: 10 INJECTION, EMULSION INTRAVENOUS at 09:55

## 2021-09-10 RX ADMIN — ONDANSETRON HYDROCHLORIDE 4 MG: 2 INJECTION, SOLUTION INTRAMUSCULAR; INTRAVENOUS at 09:37

## 2021-09-10 RX ADMIN — DEXMEDETOMIDINE HYDROCHLORIDE 8 MCG: 100 INJECTION, SOLUTION, CONCENTRATE INTRAVENOUS at 09:54

## 2021-09-10 RX ADMIN — KETOROLAC TROMETHAMINE 30 MG: 30 INJECTION, SOLUTION INTRAMUSCULAR; INTRAVENOUS at 09:55

## 2021-09-10 RX ADMIN — FENTANYL CITRATE 50 MCG: 50 INJECTION, SOLUTION INTRAMUSCULAR; INTRAVENOUS at 09:32

## 2021-09-10 RX ADMIN — FENTANYL CITRATE 50 MCG: 50 INJECTION, SOLUTION INTRAMUSCULAR; INTRAVENOUS at 09:29

## 2021-09-10 RX ADMIN — MIDAZOLAM 2 MG: 1 INJECTION INTRAMUSCULAR; INTRAVENOUS at 09:26

## 2021-09-10 RX ADMIN — ACETAMINOPHEN 650 MG: 325 TABLET ORAL at 08:03

## 2021-09-10 RX ADMIN — FENTANYL CITRATE 50 MCG: 50 INJECTION, SOLUTION INTRAMUSCULAR; INTRAVENOUS at 09:59

## 2021-09-10 RX ADMIN — SODIUM CHLORIDE, POTASSIUM CHLORIDE, SODIUM LACTATE AND CALCIUM CHLORIDE 125 ML/HR: 600; 310; 30; 20 INJECTION, SOLUTION INTRAVENOUS at 08:03

## 2021-09-10 NOTE — INTERVAL H&P NOTE
Date of Surgery Update: Zandra Curling was seen and examined. History and physical has been reviewed. The patient has been examined.  There have been no significant clinical changes since the completion of the originally dated History and Physical.     Signed By: Peterson Gunter MD     September 10, 2021 7:59 AM

## 2021-09-10 NOTE — DISCHARGE INSTRUCTIONS
27 [facility] [address], [address]  P [phone]  F [phone]     [facility]      Dear Ms. Dheeraj Aguila,      Please review your instructions with your nurse and ask any questions so you have all the information you need to recover well at home. If you do not feel you have everything you need to succeed and be safe after you leave the hospital, please discuss these concerns with your nurse. As always, call for any questions at home. Your doctor: Madeleine Solis MD   Diagnosis: VULVAR LESION  Procedure: Procedure(s):  VULVAR COLPOSCOPY/VULVAR BIOPSIES/ VULVAR LASER ABLATION/ HYSTEROSCOPY/DILATATION AND CURETTAGE WITH Dwayne Hobbs ()  . Date of Discharge: 09/10/21       Take Home Medications     See Discharge Medication Review provided to you by your nurse. If you did not receive one, request this prior to your discharge. · It is important that you take your medications as they are prescribed. · Keep your medications in the bottles provided by the pharmacist and keep a list of the medication names, dosages, times to be taken and what they are for in your wallet. · Do not take other medications without consulting your doctor. Activity    · If possible, have someone with you at all times until you feel stable. · Gradually increase your activity each day. There are generally no restrictions on walking, climbing stairs or riding in a car. Try to walk at least 4 times per day. · Showers are okay. If you have an incision, no tub bathing/swimming for two weeks. Incision    · You should expect some discomfort in the area of your incision, particularly as you increase your activity. If you notice an area of increasing redness or new drainage, please call your doctor. · Apply sulfa silvadine to the affected vulva area 1-2 times daily. · Keep area clean and dry.  Use therese-bottle to area after each urination and/or defecation. · Pat dry and use blow dryer to keep dry after shower. Causes For Concern    If any of the following occur, please call our office and speak with the Nurse/aid who will help you with your problem or ask the doctor to call you.  Problems with the incision, including increasing pain, swelling, redness or drainage.  Inability to pass urine    Increasing abdominal pain despite medication   Persisting nausea or vomiting.  Fever or chills and a temperature >101F   Constipation (no bowel movement for three days).  Diarrhea (more than three watery stools within 24 hours).  Excessive vaginal or wound bleeding.  If after hours and you cannot reach an on-call physician, call 911. Follow-Up    Call [phone] to schedule an appointment with Heidi Qureshi MD  in 4 weeks. Information obtained by :  I understand that if any problems occur once I am at home I am to contact my physician. I understand and acknowledge receipt of the instructions indicated above.                                                                                                                                            Physician's or R.N.'s Signature                                                                  Date/Time                                                                                                                                              Patient or Representative Signature                                                          Date/Time              DISCHARGE SUMMARY from Nurse    PATIENT INSTRUCTIONS:    After general anesthesia or intravenous sedation, for 24 hours or while taking prescription Narcotics:  · Limit your activities  · Do not drive and operate hazardous machinery  · Do not make important personal or business decisions  · Do  not drink alcoholic beverages  · If you have not urinated within 8 hours after discharge, please contact your surgeon on call.    Report the following to your surgeon:  · Excessive pain, swelling, redness or odor of or around the surgical area  · Temperature over 100.5  · Nausea and vomiting lasting longer than 4 hours or if unable to take medications  · Any signs of decreased circulation or nerve impairment to extremity: change in color, persistent  numbness, tingling, coldness or increase pain  · Any questions      Patient will call Dr Shad Prescott is she needs pain med stronger than tylenol or motrin. YOU RECEIVED TORADOL, SIMILAR TO MOTRIN, FOR PAIN AT 955AM. PLEASE SCHEDULE NEXT MOTRIN ACCORDING TO THE INSTRUCTIONS ON THE BOTTLE.

## 2021-09-10 NOTE — ROUTINE PROCESS
Patient: Ivette Castro MRN: [id number]  SSN: [id number]   YOB: 1980  Age: 38 y.o. Sex: female     Patient is status post Procedure(s):  VULVAR COLPOSCOPY/ VULVAR BIOPSIES/ VULVAR LASER ABLATION/ HYSTEROSCOPY/DILATATION AND CURETTAGE WITH MYOSURE . Surgeon(s) and Role:     * Yoanna Santiago MD - Primary    Local/Dose/Irrigation:  16ml lidocaine 1%                  Peripheral IV 09/10/21 Anterior; Left Hand (Active)   Site Assessment Clean, dry, & intact 09/10/21 0802   Phlebitis Assessment 0 09/10/21 0802   Infiltration Assessment 0 09/10/21 0802   Dressing Status Clean, dry, & intact 09/10/21 0802   Dressing Type Transparent;Tape 09/10/21 0802   Hub Color/Line Status Infusing 09/10/21 0802                           Dressing/Packing:  Incision 09/10/21 Perineum-Dressing/Treatment: Peripad (SILVADENE CREME) (09/10/21 1026)    Splint/Cast:  ]    Other:  NA

## 2021-09-10 NOTE — ANESTHESIA PREPROCEDURE EVALUATION
Relevant Problems   CARDIOVASCULAR   (+) Cardiac arrhythmia       Anesthetic History   No history of anesthetic complications            Review of Systems / Medical History  Patient summary reviewed, nursing notes reviewed and pertinent labs reviewed    Pulmonary  Within defined limits                 Neuro/Psych   Within defined limits           Cardiovascular  Within defined limits          Dysrhythmias            GI/Hepatic/Renal  Within defined limits              Endo/Other  Within defined limits           Other Findings              Physical Exam    Airway  Mallampati: II  TM Distance: > 6 cm  Neck ROM: normal range of motion   Mouth opening: Normal     Cardiovascular  Regular rate and rhythm,  S1 and S2 normal,  no murmur, click, rub, or gallop             Dental  No notable dental hx       Pulmonary  Breath sounds clear to auscultation               Abdominal  GI exam deferred       Other Findings            Anesthetic Plan    ASA: 2  Anesthesia type: general          Induction: Intravenous  Anesthetic plan and risks discussed with: Patient

## 2021-09-10 NOTE — OP NOTES
Gynecologic Oncology Operative Report    Christy Hooks  9/10/2021    Pre-operative dx:  1) Vulvar dysplasia; 2) Abnormal uterine bleeding    Post-operative dx:  1) Vulvar dysplasia; 2) Abnormal uterine bleeding    Procedure:  Exam under anesthesia, Hysteroscopy, dilation and curettage with Myosure, extensive laser ablation of the vulva    Surgeon:  Giuseppe Myers MD    Assistant:  None     Anesthesia:  GETA     EBL:  minimal    Complications:  none    Specimens:  Vulvar biopsies x 3: #1 right vulva at 11 o'clock; #2 left vulva at 2 o'clock; #3 perineum at 6 o'clock    Implants: none    Operative indications:  38 y.o. female with vulvar lesions and abnormal uterine bleeding/spotting     Operative findings: On exam under anesthesia, there were three area of vulvar lesions with a condylomatous appearance. As noted in my clinic notes, she had areas on both the left and right. There was a 2x3cm area at 9-12 o'clock which connected with another 2x3cm area from 12-2 o'clock. There was an additional 2x1cm area on the perineum between the vagina and anus. No other obvious lesions. Biopsy was taken at each site and then ablated with the laser. On hysteroscopy, the endometrium appearing normal. Tubal ostia visualized. No evidence of abnormal findings. Myosure performed. Operative report: After the risks, benefits, indications and alternatives of the procedure were discussed with the patient and informed consent was obtained, the patient was taken to the operating room. She was identified and then administered general anesthesia and placed in the dorsal lithotomy position in [place] Simple stirrups and prepped and draped in the usual fashion. A bivalve speculum was placed in the patient's vagina. The anterior lip of the cervix was visualized and grasped with a single-toothed tenaculum. Using Shravan Kathryn dilators, the cervix was dilated with #21 without difficulty. The uterus was sounded to 8-cm.  Using saline as the insufflating medium, the MyoSure hysteroscope was introduced into the uterine cavity. The entire cavity was inspected and the above findings noted. The Myosure was then inserted and the entire endometrial cavity sampled. The endometrial curettings were sent for permanent pathology. The tenaculum was removed from the cervix and silver nitrate applied with good hemostasis. The speculum was removed. Attention was then turned to the vulva. Using a 3mm punch biopsy, the above biopsies were obtained. Each site of biopsy was made hemostatic with Bovie electrocautery. A CO2 laser was then used to ablate the identified areas of dysplasia, using a power setting of 10 urth and a spot size of about 1 mm. A margin of normal appearing tissue was also treated circumferentially around the lesion. The surgical bed was then injected with Lidocaine. Silvadene cream was then applied to the treated area. Minimal blood loss. The patient tolerated the procedure well. The patient was then taken out of stirrups, awakened from anesthesia, and taken to the recovery room in stable condition. All surgical counts were correct.        Cintia Lopez MD  9/10/2021  10:41 AM

## 2021-09-10 NOTE — ANESTHESIA POSTPROCEDURE EVALUATION
Procedure(s):  VULVAR COLPOSCOPY/ VULVAR BIOPSIES/ VULVAR LASER ABLATION/ HYSTEROSCOPY/DILATATION AND CURETTAGE WITH MYOSURE .    general    Anesthesia Post Evaluation      Multimodal analgesia: multimodal analgesia used between 6 hours prior to anesthesia start to PACU discharge  Patient location during evaluation: PACU  Patient participation: complete - patient participated  Level of consciousness: awake  Pain score: 2  Pain management: adequate  Airway patency: patent  Anesthetic complications: no  Cardiovascular status: acceptable  Respiratory status: acceptable  Hydration status: acceptable  Comments: I have evaluated the patient and meets criteria for discharge from PACU. Vale Arzola MD  Post anesthesia nausea and vomiting:  controlled  Final Post Anesthesia Temperature Assessment:  Normothermia (36.0-37.5 degrees C)      INITIAL Post-op Vital signs:   Vitals Value Taken Time   /71 09/10/21 1130   Temp 36.7 °C (98 °F) 09/10/21 1111   Pulse 91 09/10/21 1131   Resp 21 09/10/21 1131   SpO2 96 % 09/10/21 1131   Vitals shown include unvalidated device data.

## 2021-09-15 ENCOUNTER — PATIENT MESSAGE (OUTPATIENT)
Dept: GYNECOLOGY | Age: 41
End: 2021-09-15

## 2021-09-15 DIAGNOSIS — N90.0 MILD VULVAR DYSPLASIA: Primary | ICD-10-CM

## 2021-09-15 DIAGNOSIS — G89.18 POSTOPERATIVE PAIN: ICD-10-CM

## 2021-09-15 RX ORDER — TRAMADOL HYDROCHLORIDE 50 MG/1
50 TABLET ORAL
Qty: 20 TABLET | Refills: 0 | Status: SHIPPED | OUTPATIENT
Start: 2021-09-15 | End: 2021-09-20

## 2021-09-15 NOTE — LETTER
NOTIFICATION RETURN TO WORK / SCHOOL    9/15/2021 9:07 AM    Ms. Ming Olmos  [address] [facility] [address]      To Whom It May Concern: Ming Olmos is currently under the care of Thony Clay. She has a procedure with Dr. Meghan Beckford on 9/10/2021. She may return to work/school on: 9/19/2021. If there are questions or concerns please have the patient contact our office.         Sincerely,        Yung Pineda MD

## 2021-09-15 NOTE — TELEPHONE ENCOUNTER
From: Rodney Valdes  To: Joe Sahu MD  Sent: 9/15/2021 8:04 AM EDT  Subject: Visit Follow-Up Question    Good Morning! I have a few questions about my pain after procedure I had Friday 9/10. I'm having pain in my pelvic area and back. Like a throbbing ache. I have been taking ibuprofen and tylenol, but it doesn't seem to help with this aching pain. My vulva is still painful and healing which I expected. I am still using the sulfadiazine cream twice daily. My bleeding is sometimes heavier than others, but not filling a pad. I just wanted to make sure this is normal. Im supposed to start back at work tomorrow and don't feel like I should. Is there anything else I should be doing?

## 2021-10-04 ENCOUNTER — TELEPHONE (OUTPATIENT)
Dept: GYNECOLOGY | Age: 41
End: 2021-10-04

## 2021-10-04 NOTE — PROGRESS NOTES
Vanesa Byrne,     Ms. Rodney Keisha has not made a postoperative appointment as of yet. Can we make her an appointment for 4-6 weeks after her surgery?      Thanks,    Joe Sahu MD

## 2021-10-19 NOTE — PROGRESS NOTES
Post op Visit, surgery was on 9/10/2021    1. Have you been to the ER, urgent care clinic since your last visit? Hospitalized since your last visit? Yes, surgery with Dr. Mimi Angeles on 9/10/2021    2. Have you seen or consulted any other health care providers outside of the [address] [place] [address] since your last visit? Include any pap smears or colon screening.    no

## 2021-10-20 ENCOUNTER — OFFICE VISIT (OUTPATIENT)
Dept: GYNECOLOGY | Age: 41
End: 2021-10-20
Payer: COMMERCIAL

## 2021-10-20 VITALS
BODY MASS INDEX: 21.17 KG/M2 | HEIGHT: 60 IN | HEART RATE: 102 BPM | SYSTOLIC BLOOD PRESSURE: 109 MMHG | DIASTOLIC BLOOD PRESSURE: 66 MMHG | WEIGHT: 107.8 LBS

## 2021-10-20 DIAGNOSIS — A63.0 CONDYLOMA ACUMINATUM OF VULVA: ICD-10-CM

## 2021-10-20 DIAGNOSIS — N93.9 ABNORMAL UTERINE BLEEDING (AUB): Primary | ICD-10-CM

## 2021-10-20 PROCEDURE — 99024 POSTOP FOLLOW-UP VISIT: CPT | Performed by: OBSTETRICS & GYNECOLOGY

## 2021-10-20 NOTE — PROGRESS NOTES
[address] [facility] [address], [address] [facility]  P [phone]  F [phone]    Office Note  Patient ID:  Name:  Jamaica Frazier  MRN:  [id number]  :  1980/40 y.o. Date:  10/20/2021      HISTORY OF PRESENT ILLNESS:  Ms. Jamaica Frazier is a 38 y.o. female with CRISTINA-3 s/p LEEP on 5/15/19 with positive ectocervical margins. Negative ECC. F/u pap and ECC in 2019 were normal. Pap normal 2020, 2020. Presents back today for repeat pap smear. Last seen in 2020. At that time she had reported intermenstrual spotting for the last few months, which has continued. Originally we had scheduled a surgery, but this has been delayed because of Covid and insurance issues. She is now ready to proceed with surgery. On 9/10/2021 underwent Exam under anesthesia, Hysteroscopy, dilation and curettage with Myosure, extensive laser ablation of the vulva. Final pathology consistent with BOB-3 and benign secretory endometrium. Doing well since surgery. Initial History:  Ms. Jamaica Frazier is a 38 y.o.  premenopausal female who presents in consultation from Dr. Shelbie Mehta for high-grade cervical dysplasia (CRISTINA-3). On 5/15/19, the patient underwent with Dr. Shelbie Mehta a LEEP/CKC with final pathology consistent with CRISTINA-3 as per below. Dr. Shelbie Mehta discussed ASCCP guidelines regarding CRISTINA-3 with recommendations for repeat pap/ECC in 4-6 months versus CKC. The patient presents for second opinion today to discuss possible hysterectomy. She reports a long history of abnormal pap smears, but reports this is the first procedure to her cervix. She is not desired in maintaining her fertility. She reports 2 prior vaginal deliveries. The patient is otherwise without complaints. Pertinent PMH/PSH: current everyday smoker      Active, no restrictions.      Pathology Review:   9/10/2021:  * * *FINAL PATHOLOGIC DIAGNOSIS* * *   1. 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 Endometrium, curettings:        Secretory endometrium   Negative for hyperplasia or malignancy     5/15/19:   1. Portion of cervix at 6 o'clock: high-grade squamous intraepithelial neoplasia (7mm in greatest dimension). Dysplasia is 0.5mm from the endocervical margin and 0.8mm from the ectocervical margin. 2. Portion of anterior cervix, excision: hihg-grade squamous intraepithelial lesion (2.5mm in greatest dimension) extending to inked edge. 3. Anterior cervix at 12 o'clock, excision: high-grade squmous intraepithelial lesion (14mm in greatest dimension) extending to mucosal edge marked 12 o'clock. 4. Anterior cervix, excission: predominantly stroma with scant fragments of nondysplastic endocervical surface mucosa. 5. Endocervix, curetting: scant fragments of nondysplastic endocervical mucosa. ROS:  A comprehensive review of systems was negative except for that written in the History of Present Illness. , 10 point ROS    OB/GYN ROS:  Patient denies significant menstrual problems.     ECOG stGstrstastdstest:st st1st Problem List:  Patient Active Problem List    Diagnosis Date Noted    Condyloma acuminatum of vulva 11/18/2020    Mild vulvar dysplasia 11/18/2020    Abnormal uterine bleeding (AUB) 11/18/2020    CRISTINA III (cervical intraepithelial neoplasia grade III) with severe dysplasia 06/13/2019    Palpitations 08/31/2018    Dyslipidemia, goal LDL below 100 08/31/2018    Cardiac arrhythmia 08/31/2018    Tobacco dependency 10/01/2015    PROM (premature rupture of membranes) 04/13/2012    GBS (group B Streptococcus carrier), +RV culture, currently pregnant 04/13/2012     PMH:  Past Medical History:   Diagnosis Date    Abnormal Pap smear     HPV last pap. Will follow up after pregnancy FOLLOW NEGATIVE     Anemia NEC     taking iron    Postpartum depression     Mild 3-4 months in duration      PSH:  Past Surgical History:   Procedure Laterality Date    HX GYN      2 children    HX LEEP PROCEDURE  05/15/2019    HGSIL, CRISTINA 2, CRISTINA 3    HX OTHER SURGICAL      ovarian cyst removed 1995    HX WISDOM TEETH EXTRACTION        Social History:  Social History     Tobacco Use    Smoking status: Current Every Day Smoker     Packs/day: 0.50     Years: 10.00     Pack years: 5.00    Smokeless tobacco: Never Used   Substance Use Topics    Alcohol use: Yes     Alcohol/week: 0.0 standard drinks     Comment: rare      Family History:  Family History   Problem Relation Age of Onset    Hypertension Mother     Heart Disease Maternal Grandmother     Hypertension Maternal Grandmother     Cancer Maternal Grandfather         testicular, lung    Cancer Paternal Grandmother 54        Breast    Hypertension Paternal Grandmother     Cancer Paternal Grandfather         testicular, lung    MS Sister     Anesth Problems Neg Hx       Medications: (reviewed)  Current Outpatient Medications   Medication Sig    ibuprofen (MOTRIN) 200 mg tablet Take  by mouth.  albuterol (PROVENTIL HFA, VENTOLIN HFA, PROAIR HFA) 90 mcg/actuation inhaler Take 1 Puff by inhalation every six (6) hours as needed for Wheezing. No current facility-administered medications for this visit. Allergies: (reviewed)  No Known Allergies       OBJECTIVE:  Physical Exam:  Visit Vitals  /66 (BP 1 Location: Left arm, BP Patient Position: Sitting)   Pulse (!) 102   Ht 5' (1.524 m)   Wt 107 lb 12.8 oz (48.9 kg)   LMP 10/11/2021 (Exact Date)   BMI 21.05 kg/m²      General: Alert and oriented. No acute distress. Well-nourished  HEENT: No thyroid enlargment. Neck supple without restrictions. Sclera normal. Normal occular motion. Moist mucous membranes. Lymphatics: No evidence of axillary, cervical, or subclavicular adenopathy. Respiratory: clear to auscultation and percussion to the bases. No CVAT. Cardiovascular: regular rate and rhythm. No murmurs, rubs, or gallops. Gastrointestinal: soft, non-tender, non-distended, no masses or organomegaly. Well-healed incision. Musculoskeletal: normal gait. No joint tenderness, deformity or swelling. No muscular tenderness. Extremities: extremities normal, atraumatic, no cyanosis or edema. Pelvic: exam chaperoned by nurse. Normal appearing external genitalia. She has a small 1-2cm area of condylomatous changes to mild dysplasia from 9-11 and 2-3 o'clock on the anterior labia majora. On speculum exam, the vagina and cervix are normal appearing. On bimanual, the cervix is normal to palpation, and the uterus is normal size and mobile. Deferred rectovaginal exam. Pap smear collected. Neuro: Grossly intact. Normal gait and movement. No acute deficit  Skin: No evidence of rashes or skin changes. IMPRESSION/PLAN:    Ms. Vince Reid is a 38 y.o. female with a working diagnosis of CRISTINA-3 s/p LEEP on 5/15/19 with positive ectocervical margins. Negative ECC. Repeat pap and ECC normal 9/2019. Pap normal 5/2020. On 9/10/2021 underwent Exam under anesthesia, Hysteroscopy, dilation and curettage with Myosure, extensive laser ablation of the vulva. Final pathology consistent with BOB-3 and benign secretory endometrium.      Problems:     Patient Active Problem List    Diagnosis Date Noted    Condyloma acuminatum of vulva 11/18/2020    Mild vulvar dysplasia 11/18/2020    Abnormal uterine bleeding (AUB) 11/18/2020    CRISTINA III (cervical intraepithelial neoplasia grade III) with severe dysplasia 06/13/2019    Palpitations 08/31/2018    Dyslipidemia, goal LDL below 100 08/31/2018  Cardiac arrhythmia 08/31/2018    Tobacco dependency 10/01/2015    PROM (premature rupture of membranes) 04/13/2012    GBS (group B Streptococcus carrier), +RV culture, currently pregnant 04/13/2012     Reviewed patient's course to date, including her pathology. She has healed well. RTC in 6 months for continued surveillance. All questions and concerns were addressed with the patient and she is comfortable with the plan. An electronic signature was used to authenticate this note.      Jamar Reed MD

## 2022-03-18 PROBLEM — E78.5 DYSLIPIDEMIA, GOAL LDL BELOW 100: Status: ACTIVE | Noted: 2018-08-31

## 2022-03-18 PROBLEM — N93.9 ABNORMAL UTERINE BLEEDING (AUB): Status: ACTIVE | Noted: 2020-11-18

## 2022-03-19 PROBLEM — R00.2 PALPITATIONS: Status: ACTIVE | Noted: 2018-08-31

## 2022-03-19 PROBLEM — D06.9 CIN III (CERVICAL INTRAEPITHELIAL NEOPLASIA GRADE III) WITH SEVERE DYSPLASIA: Status: ACTIVE | Noted: 2019-06-13

## 2022-03-20 PROBLEM — A63.0 CONDYLOMA ACUMINATUM OF VULVA: Status: ACTIVE | Noted: 2020-11-18

## 2022-03-20 PROBLEM — I49.9 CARDIAC ARRHYTHMIA: Status: ACTIVE | Noted: 2018-08-31

## 2022-03-20 PROBLEM — N90.0 MILD VULVAR DYSPLASIA: Status: ACTIVE | Noted: 2020-11-18

## 2022-09-16 NOTE — BRIEF OP NOTE
Brief Postoperative Note    Patient: Kelsie De Guzman  YOB: 1980  MRN: [id number]    Date of Procedure: 9/10/2021     Pre-Op Diagnosis: VULVAR LESION; POST-MENOPAUSAL BLEEDING    Post-Op Diagnosis: Same as preoperative diagnosis. Procedure(s):  VULVAR COLPOSCOPY/ VULVAR BIOPSIES/ VULVAR LASER ABLATION/ HYSTEROSCOPY/DILATATION AND CURETTAGE WITH MYOSURE     Surgeon(s): Meaghan Mulligan MD    Surgical Assistant: None    Anesthesia: General     Estimated Blood Loss (mL): Minimal    Complications: None    Specimens:   ID Type Source Tests Collected by Time Destination   1 : VULVAR BIOPSY #1 Fresh VULVA  Meaghan Mulligan MD 9/10/2021 1804 Pathology   2 : VULVAR BIOPSY #2 Fresh VULVA  Meaghan Mulligan MD 9/10/2021 3829 Pathology   3 : VULVAR BIOPSY #3 Fresh VULVA  Meaghan Mulligan MD 9/10/2021 0817 Pathology   4 : ENDOMETRIAL CURETTINGS Fresh Endometrial Curetting  Meaghan Mulligan MD 9/10/2021 1546 Pathology        Implants: * No implants in log *    Drains: * No LDAs found *    Findings: On exam under anesthesia, there were three area of vulvar lesions with a condylomatous appearance. As noted in my clinic notes, she had areas on both the left and right. There was a 2x3cm area at 9-12 o'clock which connected with another 2x3cm area from 12-2 o'clock. There was an additional 2x1cm area on the perineum between the vagina and anus. No other obvious lesions. Biopsy was taken at each site and then ablated with the laser. On hysteroscopy, the endometrium appearing normal. Tubal ostia visualized. No evidence of abnormal findings. Myosure performed.      Electronically Signed by Arielle Kaufman MD on 9/10/2021 at 10:27 AM Rituxan Counseling:  I discussed with the patient the risks of Rituxan infusions. Side effects can include infusion reactions, severe drug rashes including mucocutaneous reactions, reactivation of latent hepatitis and other infections and rarely progressive multifocal leukoencephalopathy.  All of the patient's questions and concerns were addressed.

## 2023-05-11 RX ORDER — IBUPROFEN 200 MG
TABLET ORAL
COMMUNITY

## 2023-05-11 RX ORDER — ALBUTEROL SULFATE 90 UG/1
1 AEROSOL, METERED RESPIRATORY (INHALATION) EVERY 6 HOURS PRN
COMMUNITY
Start: 2015-12-21

## 2023-08-30 ENCOUNTER — OFFICE VISIT (OUTPATIENT)
Age: 43
End: 2023-08-30
Payer: COMMERCIAL

## 2023-08-30 VITALS
WEIGHT: 114.8 LBS | SYSTOLIC BLOOD PRESSURE: 144 MMHG | BODY MASS INDEX: 22.54 KG/M2 | HEART RATE: 116 BPM | DIASTOLIC BLOOD PRESSURE: 67 MMHG | HEIGHT: 60 IN

## 2023-08-30 DIAGNOSIS — D06.9 CIN III (CERVICAL INTRAEPITHELIAL NEOPLASIA GRADE III) WITH SEVERE DYSPLASIA: Primary | ICD-10-CM

## 2023-08-30 DIAGNOSIS — N90.0 MILD VULVAR DYSPLASIA: ICD-10-CM

## 2023-08-30 PROCEDURE — 99215 OFFICE O/P EST HI 40 MIN: CPT | Performed by: OBSTETRICS & GYNECOLOGY

## 2023-08-30 NOTE — PROGRESS NOTES
6 month follow up for AUB, last seen on 10/20/2021 ,  pt reports having a cycle every 2 weeks, also having constant menstrual pain, feeling like I am always going to start my period    1. Have you been to the ER, urgent care clinic since your last visit? Hospitalized since your last visit?  no    2. Have you seen or consulted any other health care providers outside of the [address] [place] [address] since your last visit? Include any pap smears or colon screening.    no
facility-administered medications on file prior to visit. No Known Allergies   OBJECTIVE:  Physical Exam:  Vitals:    08/30/23 1510   BP: (!) 144/67   Site: Left Upper Arm   Position: Sitting   Pulse: (!) 116   Weight: 114 lb 12.8 oz (52.1 kg)   Height: 5' (1.524 m)       General: Alert and oriented. No acute distress. Well-nourished  HEENT: No thyroid enlargment. Neck supple without restrictions. Sclera normal. Normal occular motion. Moist mucous membranes. Lymphatics: No evidence of axillary, cervical, or subclavicular adenopathy. Respiratory: clear to auscultation and percussion to the bases. No CVAT. Cardiovascular: regular rate and rhythm. No murmurs, rubs, or gallops. Gastrointestinal: soft, non-tender, non-distended, no masses or organomegaly. Well-healed incision. Musculoskeletal: normal gait. No joint tenderness, deformity or swelling. No muscular tenderness. Extremities: extremities normal, atraumatic, no cyanosis or edema. Pelvic: exam chaperoned by nurse. Normal appearing external genitalia overall. She has vulvar dysplasia from approximately 12:00 to 3:00 on the left vulva lateral to the clitoris. She also has a few smaller areas of dysplasia, condylomatous appearing, along the perineum. On speculum exam, the vagina and cervix are normal appearing. On bimanual, the cervix is normal to palpation, and the uterus is normal size and mobile. Deferred rectovaginal exam. Pap smear collected. Neuro: Grossly intact. Normal gait and movement. No acute deficit  Skin: No evidence of rashes or skin changes. IMPRESSION/PLAN:    Ms. Claudeen Musty is a 36 y.o.  female with a working diagnosis of GISSELL-3 s/p LEEP on 5/15/19 with positive ectocervical margins. Negative ECC. Repeat pap and ECC normal 9/2019. Pap normal 5/2020. On 9/10/2021 underwent Exam under anesthesia, Hysteroscopy, dilation and curettage with Myosure, extensive laser ablation of the vulva.  Final pathology consistent with

## 2023-09-07 LAB
CYTOLOGIST CVX/VAG CYTO: NORMAL
CYTOLOGY CVX/VAG DOC CYTO: NORMAL
CYTOLOGY CVX/VAG DOC THIN PREP: NORMAL
DX ICD CODE: NORMAL
HPV GENOTYPE REFLEX: NORMAL
HPV I/H RISK 4 DNA CVX QL PROBE+SIG AMP: NEGATIVE
Lab: NORMAL
Lab: NORMAL
OTHER STN SPEC: NORMAL
STAT OF ADQ CVX/VAG CYTO-IMP: NORMAL

## 2024-01-19 NOTE — PROGRESS NOTES
Follow up to discuss surgery, last seen on 8/30/2023, recommended hysteroscopy D&C    1. Have you been to the ER, urgent care clinic since your last visit?  Hospitalized since your last visit?  no    2. Have you seen or consulted any other health care providers outside of the [facility] since your last visit?  Include any pap smears or colon screening.   no

## 2024-01-24 ENCOUNTER — OFFICE VISIT (OUTPATIENT)
Age: 44
End: 2024-01-24
Payer: COMMERCIAL

## 2024-01-24 VITALS
BODY MASS INDEX: 23.09 KG/M2 | HEART RATE: 104 BPM | DIASTOLIC BLOOD PRESSURE: 73 MMHG | WEIGHT: 117.6 LBS | SYSTOLIC BLOOD PRESSURE: 134 MMHG | HEIGHT: 60 IN

## 2024-01-24 DIAGNOSIS — N93.9 ABNORMAL UTERINE BLEEDING (AUB): ICD-10-CM

## 2024-01-24 DIAGNOSIS — N90.0 MILD VULVAR DYSPLASIA: Primary | ICD-10-CM

## 2024-01-24 PROCEDURE — 99213 OFFICE O/P EST LOW 20 MIN: CPT | Performed by: OBSTETRICS & GYNECOLOGY

## 2024-01-24 NOTE — H&P (VIEW-ONLY)
[facility] GYNECOLOGIC ONCOLOGY  [address] [place] [address], [address]  [place] [address]  P [phone]  F [phone]    Office Note  Patient ID:  Name:  Eugenie Zafar  MRN:  [id number]  :  1980/40 y.o.  Date:  10/20/2021      HISTORY OF PRESENT ILLNESS:  Ms. Eugenie Zafar is a 43 y.o.   female with GISSELL-3 s/p LEEP on 5/15/19 with positive ectocervical margins. Negative ECC. F/u pap and ECC in 2019 were normal. Pap normal 2020, 2020.    Presents back today for repeat pap smear. Last seen in 2020. At that time she had reported intermenstrual spotting for the last few months, which has continued. Originally we had scheduled a surgery, but this has been delayed because of Covid and insurance issues. She is now ready to proceed with surgery.     On 9/10/2021 underwent Exam under anesthesia, Hysteroscopy, dilation and curettage with Myosure, extensive laser ablation of the vulva. Final pathology consistent with TERRELL-3 and benign secretory endometrium.     Patient has been lost to follow-up since 2021.  Reports that she is concerned for recurrence of her dysplasia on her vulva on the left side.  Patient also reports abnormal menses, with bleeding every 2 weeks and worsening cramping.  She reports this has been going on for the past few months.  Denies all other complaints.    Initial History:  Ms. Eugenie Zafar is a 40 y.o.  premenopausal female who presents in consultation from Dr. Khoury for high-grade cervical dysplasia (GISSELL-3).    On 5/15/19, the patient underwent with Dr. Khoury a LEEP/CKC with final pathology consistent with GISSELL-3 as per below. Dr. Khoury discussed ASCCP guidelines regarding GISSELL-3 with recommendations for repeat pap/ECC in 4-6 months versus CKC. The patient presents for second opinion today to discuss possible hysterectomy. She reports a long history of abnormal pap smears, but reports this is the first procedure to her cervix. She is not desired in maintaining  prior to visit.       No Known Allergies   OBJECTIVE:  Physical Exam:  Vitals:    01/24/24 0812   BP: 134/73   Site: Left Upper Arm   Position: Sitting   Pulse: (!) 104   Weight: 53.3 kg (117 lb 9.6 oz)   Height: 1.524 m (5')         General: Alert and oriented. No acute distress. Well-nourished  HEENT: No thyroid enlargment. Neck supple without restrictions. Sclera normal. Normal occular motion. Moist mucous membranes.  Lymphatics: No evidence of axillary, cervical, or subclavicular adenopathy.   Respiratory: clear to auscultation and percussion to the bases. No CVAT.  Cardiovascular: regular rate and rhythm. No murmurs, rubs, or gallops.  Gastrointestinal: soft, non-tender, non-distended, no masses or organomegaly. Well-healed incision.   Musculoskeletal: normal gait. No joint tenderness, deformity or swelling. No muscular tenderness.   Extremities: extremities normal, atraumatic, no cyanosis or edema.  Pelvic: exam chaperoned by nurse. Normal appearing external genitalia overall.  She has vulvar dysplasia from approximately 12:00 to 3:00 on the left vulva lateral to the clitoris.  She also has a few smaller areas of dysplasia, condylomatous appearing, along the perineum. On speculum exam, the vagina and cervix are normal appearing. On bimanual, the cervix is normal to palpation, and the uterus is normal size and mobile. Deferred rectovaginal exam. Pap smear collected.   Neuro: Grossly intact. Normal gait and movement. No acute deficit  Skin: No evidence of rashes or skin changes.       IMPRESSION/PLAN:    Ms. Eugenie Zafar is a 43 y.o.  female with a working diagnosis of GISSELL-3 s/p LEEP on 5/15/19 with positive ectocervical margins. Negative ECC. Repeat pap and ECC normal 9/2019. Pap normal 5/2020.    On 9/10/2021 underwent Exam under anesthesia, Hysteroscopy, dilation and curettage with Myosure, extensive laser ablation of the vulva. Final pathology consistent with TERRELL-3 and benign secretory endometrium.

## 2024-01-24 NOTE — PROGRESS NOTES
Formerly [facility] GYNECOLOGIC ONCOLOGY  [address] [place] [address], [address]  [place] [address]  P [phone]  F [phone]    Office Note  Patient ID:  Name:  Eugenie Zafar  MRN:  [id number]  :  1980/40 y.o.  Date:  10/20/2021      HISTORY OF PRESENT ILLNESS:  Ms. Eugenie Zafar is a 43 y.o.   female with GISSELL-3 s/p LEEP on 5/15/19 with positive ectocervical margins. Negative ECC. F/u pap and ECC in 2019 were normal. Pap normal 2020, 2020.    Presents back today for repeat pap smear. Last seen in 2020. At that time she had reported intermenstrual spotting for the last few months, which has continued. Originally we had scheduled a surgery, but this has been delayed because of Covid and insurance issues. She is now ready to proceed with surgery.     On 9/10/2021 underwent Exam under anesthesia, Hysteroscopy, dilation and curettage with Myosure, extensive laser ablation of the vulva. Final pathology consistent with TERRELL-3 and benign secretory endometrium.     Patient has been lost to follow-up since 2021.  Reports that she is concerned for recurrence of her dysplasia on her vulva on the left side.  Patient also reports abnormal menses, with bleeding every 2 weeks and worsening cramping.  She reports this has been going on for the past few months.  Denies all other complaints.    Initial History:  Ms. Eugenie Zafar is a 40 y.o.  premenopausal female who presents in consultation from Dr. Khoury for high-grade cervical dysplasia (GISSELL-3).    On 5/15/19, the patient underwent with Dr. Khoury a LEEP/CKC with final pathology consistent with GISSELL-3 as per below. Dr. Khoury discussed ASCCP guidelines regarding GISSELL-3 with recommendations for repeat pap/ECC in 4-6 months versus CKC. The patient presents for second opinion today to discuss possible hysterectomy. She reports a long history of abnormal pap smears, but reports this is the first procedure to her cervix. She is not desired in maintaining

## 2024-02-07 ENCOUNTER — TELEPHONE (OUTPATIENT)
Age: 44
End: 2024-02-07

## 2024-02-08 ENCOUNTER — TELEPHONE (OUTPATIENT)
Age: 44
End: 2024-02-08

## 2024-02-08 NOTE — TELEPHONE ENCOUNTER
Eugenie was returning your call.  I told her her surgery time has changed and I told her she needed to be her a 8:00 am for a 9:30 am surgery time but if prather could give her a call to confirm.

## 2024-02-19 ENCOUNTER — HOSPITAL ENCOUNTER (OUTPATIENT)
Facility: HOSPITAL | Age: 44
Discharge: HOME | End: 2024-02-22
Payer: COMMERCIAL

## 2024-02-19 VITALS
WEIGHT: 118.4 LBS | RESPIRATION RATE: 20 BRPM | HEART RATE: 87 BPM | TEMPERATURE: 98 F | HEIGHT: 60 IN | DIASTOLIC BLOOD PRESSURE: 72 MMHG | SYSTOLIC BLOOD PRESSURE: 117 MMHG | BODY MASS INDEX: 23.25 KG/M2

## 2024-02-19 LAB
ALBUMIN SERPL-MCNC: 3.8 G/DL (ref 3.5–5)
ALBUMIN/GLOB SERPL: 1.3 (ref 1.1–2.2)
ALP SERPL-CCNC: 73 U/L (ref 45–117)
ALT SERPL-CCNC: 13 U/L (ref 12–78)
ANION GAP SERPL CALC-SCNC: 4 MMOL/L (ref 5–15)
AST SERPL-CCNC: 12 U/L (ref 15–37)
BASOPHILS # BLD: 0.1 K/UL (ref 0–0.1)
BASOPHILS NFR BLD: 2 % (ref 0–1)
BILIRUB SERPL-MCNC: 0.2 MG/DL (ref 0.2–1)
BUN SERPL-MCNC: 11 MG/DL (ref 6–20)
BUN/CREAT SERPL: 15 (ref 12–20)
CALCIUM SERPL-MCNC: 8.9 MG/DL (ref 8.5–10.1)
CHLORIDE SERPL-SCNC: 108 MMOL/L (ref 97–108)
CO2 SERPL-SCNC: 28 MMOL/L (ref 21–32)
CREAT SERPL-MCNC: 0.74 MG/DL (ref 0.55–1.02)
DIFFERENTIAL METHOD BLD: ABNORMAL
EOSINOPHIL # BLD: 0.1 K/UL (ref 0–0.4)
EOSINOPHIL NFR BLD: 2 % (ref 0–7)
ERYTHROCYTE [DISTWIDTH] IN BLOOD BY AUTOMATED COUNT: 14.5 % (ref 11.5–14.5)
FSH SERPL-ACNC: 4.1 MIU/ML
GLOBULIN SER CALC-MCNC: 3 G/DL (ref 2–4)
GLUCOSE SERPL-MCNC: 74 MG/DL (ref 65–100)
HCG UR QL: NEGATIVE
HCT VFR BLD AUTO: 41.6 % (ref 35–47)
HGB BLD-MCNC: 13.5 G/DL (ref 11.5–16)
IMM GRANULOCYTES # BLD AUTO: 0 K/UL
IMM GRANULOCYTES NFR BLD AUTO: 0 %
LH SERPL-ACNC: 6.8 MIU/ML
LYMPHOCYTES # BLD: 3.4 K/UL (ref 0.8–3.5)
LYMPHOCYTES NFR BLD: 46 % (ref 12–49)
MCH RBC QN AUTO: 29.3 PG (ref 26–34)
MCHC RBC AUTO-ENTMCNC: 32.5 G/DL (ref 30–36.5)
MCV RBC AUTO: 90.2 FL (ref 80–99)
MONOCYTES # BLD: 0.7 K/UL (ref 0–1)
MONOCYTES NFR BLD: 9 % (ref 5–13)
NEUTS SEG # BLD: 3 K/UL (ref 1.8–8)
NEUTS SEG NFR BLD: 41 % (ref 32–75)
NRBC # BLD: 0 K/UL (ref 0–0.01)
NRBC BLD-RTO: 0 PER 100 WBC
PLATELET # BLD AUTO: 202 K/UL (ref 150–400)
PLATELET COMMENT: ABNORMAL
PMV BLD AUTO: 10.6 FL (ref 8.9–12.9)
POTASSIUM SERPL-SCNC: 4.2 MMOL/L (ref 3.5–5.1)
PROT SERPL-MCNC: 6.8 G/DL (ref 6.4–8.2)
RBC # BLD AUTO: 4.61 M/UL (ref 3.8–5.2)
RBC MORPH BLD: ABNORMAL
SODIUM SERPL-SCNC: 140 MMOL/L (ref 136–145)
WBC # BLD AUTO: 7.3 K/UL (ref 3.6–11)
WBC MORPH BLD: ABNORMAL

## 2024-02-19 PROCEDURE — 36415 COLL VENOUS BLD VENIPUNCTURE: CPT

## 2024-02-19 PROCEDURE — 81025 URINE PREGNANCY TEST: CPT

## 2024-02-19 PROCEDURE — 83001 ASSAY OF GONADOTROPIN (FSH): CPT

## 2024-02-19 PROCEDURE — 80053 COMPREHEN METABOLIC PANEL: CPT

## 2024-02-19 PROCEDURE — 83002 ASSAY OF GONADOTROPIN (LH): CPT

## 2024-02-19 PROCEDURE — 85025 COMPLETE CBC W/AUTO DIFF WBC: CPT

## 2024-02-19 NOTE — PERIOP NOTE
surgeon as soon as possible.  It is important to be on time. If a situation occurs where you may be delayed, please call:  [phone] / [phone] on the day of surgery.  The Preadmission Testing staff can be reached at [phone].  Special instructions: NONE      Current Outpatient Medications   Medication Sig    ibuprofen (ADVIL;MOTRIN) 200 MG tablet Take by mouth as needed     No current facility-administered medications for this encounter.       YOU MUST ONLY TAKE THESE MEDICATIONS THE MORNING OF SURGERY WITH A SIP OF WATER: NONE  MEDICATIONS TO TAKE THE MORNING OF SURGERY ONLY IF NEEDED: NONE  HOLD these prescription medications BEFORE Surgery: NONE  Ask your surgeon/prescribing physician about when/if to STOP taking these medications: NONE.  (If you are currently taking Plavix, Coumadin, or any other blood-thinning/anticoagulant medication contact your prescribing physician for instructions).  Stop all vitamins, herbal medicines and Aspirin containing products 7 days prior to surgery. Stop any non-steroidal anti-inflammatory drugs (i.e. Ibuprofen, Naproxen, Advil, Aleve) 3 days before surgery. You may take Tylenol.        Preventing Infections Before and After - Your Surgery    IMPORTANT INSTRUCTIONS      You play an important role in your health and preparation for surgery. To reduce the germs on your skin you will need to shower with CHG soap (Chorhexidine gluconate 4%) two times before surgery.    CHG soap (Hibiclens, Hex-A-Clens or store brand)  CHG soap will be provided at your Preadmission Testing (PAT) appointment.  If you do not have a PAT appointment before surgery, you may arrange to  CHG soap from our office or purchase CHG soap at a pharmacy, grocery or department store.  You need to purchase TWO 4 ounce bottles to use for your 2 showers.    Steps to follow:  Wash your hair with your normal shampoo and your body with regular soap and rinse well to remove shampoo and soap from

## 2024-02-22 ENCOUNTER — ANESTHESIA EVENT (OUTPATIENT)
Facility: HOSPITAL | Age: 44
End: 2024-02-22
Payer: COMMERCIAL

## 2024-02-22 ENCOUNTER — ANESTHESIA (OUTPATIENT)
Facility: HOSPITAL | Age: 44
End: 2024-02-22
Payer: COMMERCIAL

## 2024-02-22 ENCOUNTER — HOSPITAL ENCOUNTER (OUTPATIENT)
Facility: HOSPITAL | Age: 44
Setting detail: OUTPATIENT SURGERY
Discharge: HOME | End: 2024-02-22
Attending: OBSTETRICS & GYNECOLOGY | Admitting: OBSTETRICS & GYNECOLOGY
Payer: COMMERCIAL

## 2024-02-22 VITALS
DIASTOLIC BLOOD PRESSURE: 64 MMHG | HEIGHT: 60 IN | OXYGEN SATURATION: 95 % | TEMPERATURE: 97.8 F | RESPIRATION RATE: 21 BRPM | SYSTOLIC BLOOD PRESSURE: 114 MMHG | WEIGHT: 118 LBS | HEART RATE: 76 BPM | BODY MASS INDEX: 23.16 KG/M2

## 2024-02-22 DIAGNOSIS — N90.0 MILD VULVAR DYSPLASIA: ICD-10-CM

## 2024-02-22 DIAGNOSIS — G89.18 POST-OP PAIN: ICD-10-CM

## 2024-02-22 DIAGNOSIS — Z01.818 PRE-OP TESTING: Primary | ICD-10-CM

## 2024-02-22 LAB — HCG UR QL: NEGATIVE

## 2024-02-22 PROCEDURE — 6360000002 HC RX W HCPCS: Performed by: OBSTETRICS & GYNECOLOGY

## 2024-02-22 PROCEDURE — 88305 TISSUE EXAM BY PATHOLOGIST: CPT

## 2024-02-22 PROCEDURE — 7100000010 HC PHASE II RECOVERY - FIRST 15 MIN: Performed by: OBSTETRICS & GYNECOLOGY

## 2024-02-22 PROCEDURE — 81025 URINE PREGNANCY TEST: CPT

## 2024-02-22 PROCEDURE — 2580000003 HC RX 258: Performed by: PHYSICIAN ASSISTANT

## 2024-02-22 PROCEDURE — 3600000004 HC SURGERY LEVEL 4 BASE: Performed by: OBSTETRICS & GYNECOLOGY

## 2024-02-22 PROCEDURE — 6360000002 HC RX W HCPCS: Performed by: ANESTHESIOLOGY

## 2024-02-22 PROCEDURE — 2709999900 HC NON-CHARGEABLE SUPPLY: Performed by: OBSTETRICS & GYNECOLOGY

## 2024-02-22 PROCEDURE — 6360000002 HC RX W HCPCS: Performed by: PHYSICIAN ASSISTANT

## 2024-02-22 PROCEDURE — 2500000003 HC RX 250 WO HCPCS: Performed by: OBSTETRICS & GYNECOLOGY

## 2024-02-22 PROCEDURE — 7100000001 HC PACU RECOVERY - ADDTL 15 MIN: Performed by: OBSTETRICS & GYNECOLOGY

## 2024-02-22 PROCEDURE — 6370000000 HC RX 637 (ALT 250 FOR IP): Performed by: ANESTHESIOLOGY

## 2024-02-22 PROCEDURE — 2580000003 HC RX 258: Performed by: ANESTHESIOLOGY

## 2024-02-22 PROCEDURE — 7100000000 HC PACU RECOVERY - FIRST 15 MIN: Performed by: OBSTETRICS & GYNECOLOGY

## 2024-02-22 PROCEDURE — 3600000014 HC SURGERY LEVEL 4 ADDTL 15MIN: Performed by: OBSTETRICS & GYNECOLOGY

## 2024-02-22 PROCEDURE — 2500000003 HC RX 250 WO HCPCS: Performed by: ANESTHESIOLOGY

## 2024-02-22 PROCEDURE — 3700000000 HC ANESTHESIA ATTENDED CARE: Performed by: OBSTETRICS & GYNECOLOGY

## 2024-02-22 PROCEDURE — 6370000000 HC RX 637 (ALT 250 FOR IP): Performed by: OBSTETRICS & GYNECOLOGY

## 2024-02-22 PROCEDURE — 3700000001 HC ADD 15 MINUTES (ANESTHESIA): Performed by: OBSTETRICS & GYNECOLOGY

## 2024-02-22 PROCEDURE — 7100000011 HC PHASE II RECOVERY - ADDTL 15 MIN: Performed by: OBSTETRICS & GYNECOLOGY

## 2024-02-22 RX ORDER — SODIUM CHLORIDE 9 MG/ML
INJECTION, SOLUTION INTRAVENOUS PRN
Status: DISCONTINUED | OUTPATIENT
Start: 2024-02-22 | End: 2024-02-22 | Stop reason: HOSPADM

## 2024-02-22 RX ORDER — LIDOCAINE HYDROCHLORIDE 20 MG/ML
INJECTION, SOLUTION EPIDURAL; INFILTRATION; INTRACAUDAL; PERINEURAL PRN
Status: DISCONTINUED | OUTPATIENT
Start: 2024-02-22 | End: 2024-02-22 | Stop reason: SDUPTHER

## 2024-02-22 RX ORDER — TRAMADOL HYDROCHLORIDE 50 MG/1
50 TABLET ORAL EVERY 4 HOURS PRN
Qty: 18 TABLET | Refills: 0 | Status: SHIPPED | OUTPATIENT
Start: 2024-02-22 | End: 2024-02-25

## 2024-02-22 RX ORDER — HYDROMORPHONE HYDROCHLORIDE 1 MG/ML
0.5 INJECTION, SOLUTION INTRAMUSCULAR; INTRAVENOUS; SUBCUTANEOUS EVERY 5 MIN PRN
Status: DISCONTINUED | OUTPATIENT
Start: 2024-02-22 | End: 2024-02-22 | Stop reason: HOSPADM

## 2024-02-22 RX ORDER — KETOROLAC TROMETHAMINE 30 MG/ML
INJECTION, SOLUTION INTRAMUSCULAR; INTRAVENOUS PRN
Status: DISCONTINUED | OUTPATIENT
Start: 2024-02-22 | End: 2024-02-22 | Stop reason: SDUPTHER

## 2024-02-22 RX ORDER — SODIUM CHLORIDE 0.9 % (FLUSH) 0.9 %
5-40 SYRINGE (ML) INJECTION EVERY 12 HOURS SCHEDULED
Status: DISCONTINUED | OUTPATIENT
Start: 2024-02-22 | End: 2024-02-22 | Stop reason: HOSPADM

## 2024-02-22 RX ORDER — MIDAZOLAM HYDROCHLORIDE 2 MG/2ML
2 INJECTION, SOLUTION INTRAMUSCULAR; INTRAVENOUS
Status: DISCONTINUED | OUTPATIENT
Start: 2024-02-22 | End: 2024-02-22 | Stop reason: HOSPADM

## 2024-02-22 RX ORDER — ACETIC ACID 5 %
LIQUID (ML) MISCELLANEOUS PRN
Status: DISCONTINUED | OUTPATIENT
Start: 2024-02-22 | End: 2024-02-22 | Stop reason: HOSPADM

## 2024-02-22 RX ORDER — LIDOCAINE HYDROCHLORIDE 10 MG/ML
1 INJECTION, SOLUTION EPIDURAL; INFILTRATION; INTRACAUDAL; PERINEURAL
Status: DISCONTINUED | OUTPATIENT
Start: 2024-02-22 | End: 2024-02-22 | Stop reason: HOSPADM

## 2024-02-22 RX ORDER — SODIUM CHLORIDE 0.9 % (FLUSH) 0.9 %
5-40 SYRINGE (ML) INJECTION PRN
Status: DISCONTINUED | OUTPATIENT
Start: 2024-02-22 | End: 2024-02-22 | Stop reason: HOSPADM

## 2024-02-22 RX ORDER — FENTANYL CITRATE 50 UG/ML
100 INJECTION, SOLUTION INTRAMUSCULAR; INTRAVENOUS
Status: DISCONTINUED | OUTPATIENT
Start: 2024-02-22 | End: 2024-02-22 | Stop reason: HOSPADM

## 2024-02-22 RX ORDER — PROCHLORPERAZINE EDISYLATE 5 MG/ML
5 INJECTION INTRAMUSCULAR; INTRAVENOUS
Status: DISCONTINUED | OUTPATIENT
Start: 2024-02-22 | End: 2024-02-22 | Stop reason: HOSPADM

## 2024-02-22 RX ORDER — BUPIVACAINE HYDROCHLORIDE 5 MG/ML
INJECTION, SOLUTION EPIDURAL; INTRACAUDAL PRN
Status: DISCONTINUED | OUTPATIENT
Start: 2024-02-22 | End: 2024-02-22 | Stop reason: HOSPADM

## 2024-02-22 RX ORDER — DEXAMETHASONE SODIUM PHOSPHATE 4 MG/ML
INJECTION, SOLUTION INTRA-ARTICULAR; INTRALESIONAL; INTRAMUSCULAR; INTRAVENOUS; SOFT TISSUE PRN
Status: DISCONTINUED | OUTPATIENT
Start: 2024-02-22 | End: 2024-02-22 | Stop reason: SDUPTHER

## 2024-02-22 RX ORDER — ONDANSETRON 2 MG/ML
4 INJECTION INTRAMUSCULAR; INTRAVENOUS
Status: DISCONTINUED | OUTPATIENT
Start: 2024-02-22 | End: 2024-02-22 | Stop reason: HOSPADM

## 2024-02-22 RX ORDER — PHENYLEPHRINE HCL IN 0.9% NACL 0.4MG/10ML
SYRINGE (ML) INTRAVENOUS PRN
Status: DISCONTINUED | OUTPATIENT
Start: 2024-02-22 | End: 2024-02-22 | Stop reason: SDUPTHER

## 2024-02-22 RX ORDER — OXYCODONE HYDROCHLORIDE 5 MG/1
5 TABLET ORAL
Status: DISCONTINUED | OUTPATIENT
Start: 2024-02-22 | End: 2024-02-22 | Stop reason: HOSPADM

## 2024-02-22 RX ORDER — HYDRALAZINE HYDROCHLORIDE 20 MG/ML
10 INJECTION INTRAMUSCULAR; INTRAVENOUS
Status: DISCONTINUED | OUTPATIENT
Start: 2024-02-22 | End: 2024-02-22 | Stop reason: HOSPADM

## 2024-02-22 RX ORDER — ACETAMINOPHEN 500 MG
1000 TABLET ORAL ONCE
Status: COMPLETED | OUTPATIENT
Start: 2024-02-22 | End: 2024-02-22

## 2024-02-22 RX ORDER — MIDAZOLAM HYDROCHLORIDE 1 MG/ML
INJECTION INTRAMUSCULAR; INTRAVENOUS PRN
Status: DISCONTINUED | OUTPATIENT
Start: 2024-02-22 | End: 2024-02-22 | Stop reason: SDUPTHER

## 2024-02-22 RX ORDER — ONDANSETRON 2 MG/ML
INJECTION INTRAMUSCULAR; INTRAVENOUS PRN
Status: DISCONTINUED | OUTPATIENT
Start: 2024-02-22 | End: 2024-02-22 | Stop reason: SDUPTHER

## 2024-02-22 RX ORDER — FENTANYL CITRATE 50 UG/ML
INJECTION, SOLUTION INTRAMUSCULAR; INTRAVENOUS PRN
Status: DISCONTINUED | OUTPATIENT
Start: 2024-02-22 | End: 2024-02-22 | Stop reason: SDUPTHER

## 2024-02-22 RX ORDER — FENTANYL CITRATE 50 UG/ML
25 INJECTION, SOLUTION INTRAMUSCULAR; INTRAVENOUS EVERY 5 MIN PRN
Status: DISCONTINUED | OUTPATIENT
Start: 2024-02-22 | End: 2024-02-22 | Stop reason: HOSPADM

## 2024-02-22 RX ORDER — SODIUM CHLORIDE, SODIUM LACTATE, POTASSIUM CHLORIDE, CALCIUM CHLORIDE 600; 310; 30; 20 MG/100ML; MG/100ML; MG/100ML; MG/100ML
INJECTION, SOLUTION INTRAVENOUS CONTINUOUS
Status: DISCONTINUED | OUTPATIENT
Start: 2024-02-22 | End: 2024-02-22 | Stop reason: HOSPADM

## 2024-02-22 RX ADMIN — MIDAZOLAM HYDROCHLORIDE 3 MG: 1 INJECTION, SOLUTION INTRAMUSCULAR; INTRAVENOUS at 10:04

## 2024-02-22 RX ADMIN — PROPOFOL 50 MG: 10 INJECTION, EMULSION INTRAVENOUS at 10:47

## 2024-02-22 RX ADMIN — ACETAMINOPHEN 1000 MG: 500 TABLET ORAL at 09:40

## 2024-02-22 RX ADMIN — FENTANYL CITRATE 50 MCG: 50 INJECTION, SOLUTION INTRAMUSCULAR; INTRAVENOUS at 10:34

## 2024-02-22 RX ADMIN — MIDAZOLAM HYDROCHLORIDE 2 MG: 1 INJECTION, SOLUTION INTRAMUSCULAR; INTRAVENOUS at 10:06

## 2024-02-22 RX ADMIN — ONDANSETRON 4 MG: 2 INJECTION INTRAMUSCULAR; INTRAVENOUS at 10:25

## 2024-02-22 RX ADMIN — Medication 80 MCG: at 10:40

## 2024-02-22 RX ADMIN — CEFOXITIN SODIUM 2000 MG: 2 POWDER, FOR SOLUTION INTRAVENOUS at 10:14

## 2024-02-22 RX ADMIN — DEXAMETHASONE SODIUM PHOSPHATE 4 MG: 4 INJECTION INTRA-ARTICULAR; INTRALESIONAL; INTRAMUSCULAR; INTRAVENOUS; SOFT TISSUE at 10:25

## 2024-02-22 RX ADMIN — KETOROLAC TROMETHAMINE 30 MG: 30 INJECTION, SOLUTION INTRAMUSCULAR; INTRAVENOUS at 10:57

## 2024-02-22 RX ADMIN — FENTANYL CITRATE 25 MCG: 50 INJECTION, SOLUTION INTRAMUSCULAR; INTRAVENOUS at 10:19

## 2024-02-22 RX ADMIN — PROPOFOL 300 MG: 10 INJECTION, EMULSION INTRAVENOUS at 10:09

## 2024-02-22 RX ADMIN — SODIUM CHLORIDE, POTASSIUM CHLORIDE, SODIUM LACTATE AND CALCIUM CHLORIDE: 600; 310; 30; 20 INJECTION, SOLUTION INTRAVENOUS at 09:27

## 2024-02-22 RX ADMIN — FENTANYL CITRATE 25 MCG: 50 INJECTION, SOLUTION INTRAMUSCULAR; INTRAVENOUS at 10:44

## 2024-02-22 RX ADMIN — LIDOCAINE HYDROCHLORIDE 100 MG: 20 INJECTION, SOLUTION EPIDURAL; INFILTRATION; INTRACAUDAL; PERINEURAL at 10:09

## 2024-02-22 ASSESSMENT — PAIN - FUNCTIONAL ASSESSMENT: PAIN_FUNCTIONAL_ASSESSMENT: NONE - DENIES PAIN

## 2024-02-22 ASSESSMENT — PAIN SCALES - GENERAL
PAINLEVEL_OUTOF10: 0
PAINLEVEL_OUTOF10: 0

## 2024-02-22 NOTE — ANESTHESIA POSTPROCEDURE EVALUATION
Department of Anesthesiology  Postprocedure Note    Patient: Eugenie Zafar  MRN: [id number]  YOB: 1980  Date of evaluation: 2/22/2024    Procedure Summary       Date: 02/22/24 Room / Location: [facility] MAIN OR [address] [place] [address] MAIN OR    Anesthesia Start: 1004 Anesthesia Stop: 1112    Procedures:       VULVAR BIOPSIES, LASER ABLATION OF VULVA, HYSTEROSCOPY, DILATION AND CURETTAGE (Vagina )      . (Vagina ) Diagnosis:       Moderate dysplasia of vulva      (Moderate dysplasia of vulva [N90.1])    Providers: Santosh Almeida MD Responsible Provider: Nikos Ruano MD    Anesthesia Type: General ASA Status: 2            Anesthesia Type: General    Ivelisse Phase I:      Ivelisse Phase II:      Anesthesia Post Evaluation    Patient location during evaluation: PACU  Patient participation: complete - patient participated  Level of consciousness: awake  Airway patency: patent  Nausea & Vomiting: no nausea  Cardiovascular status: blood pressure returned to baseline and hemodynamically stable  Respiratory status: acceptable  Hydration status: stable  Multimodal analgesia pain management approach    No notable events documented.

## 2024-02-22 NOTE — ANESTHESIA PRE PROCEDURE
Department of Anesthesiology  Preprocedure Note       Name:  Eugenie Zafar   Age:  43 y.o.  :  1980                                          MRN:  [id number]         Date:  2024      Surgeon: Surgeon(s):  Santosh Almeida MD    Procedure: Procedure(s):  VULVAR BIOPSIES, LASER ABLATION OF VULVA, HYSTEROSCOPY, DILATION AND CURETTAGE  .    Medications prior to admission:   Prior to Admission medications    Medication Sig Start Date End Date Taking? Authorizing Provider   ibuprofen (ADVIL;MOTRIN) 200 MG tablet Take by mouth as needed    Automatic Reconciliation, Ar       Current medications:    No current facility-administered medications for this encounter.       Allergies:  No Known Allergies    Problem List:    Patient Active Problem List   Diagnosis Code   • Dyslipidemia, goal LDL below 100 E78.5   • Abnormal uterine bleeding (AUB) N93.9   • Tobacco dependency F17.200   • Palpitations R00.2   • PROM (premature rupture of membranes) O42.90   • GISSELL III (cervical intraepithelial neoplasia grade III) with severe dysplasia D06.9   • GBS (group B Streptococcus carrier), +RV culture, currently pregnant O99.820   • Mild vulvar dysplasia N90.0   • Cardiac arrhythmia I49.9   • Condyloma acuminatum of vulva A63.0       Past Medical History:        Diagnosis Date   • Abnormal Pap smear     HPV last pap. Will follow up after pregnancy FOLLOW NEGATIVE    • Postpartum depression     Mild 3-4 months in duration       Past Surgical History:        Procedure Laterality Date   • GYN      2 children   • LEEP  05/15/2019    HGSIL, GISSELL 2, GISSELL 3   • OTHER SURGICAL HISTORY      ovarian cyst removed    • WISDOM TOOTH EXTRACTION         Social History:    Social History     Tobacco Use   • Smoking status: Every Day     Current packs/day: 0.50     Average packs/day: 0.5 packs/day for 27.1 years (13.6 ttl pk-yrs)     Types: Cigarettes     Start date:    • Smokeless tobacco: Never   Substance Use Topics   • Alcohol

## 2024-02-22 NOTE — BRIEF OP NOTE
Brief Postoperative Note      Patient: Eugenie Zafar  YOB: 1980  MRN: [id number]    Date of Procedure: 2/22/2024    Pre-Op Diagnosis Codes:     * Moderate dysplasia of vulva [N90.1]    Post-Op Diagnosis: Same       Procedure(s):  VULVAR BIOPSIES, LASER ABLATION OF VULVA, HYSTEROSCOPY, DILATION AND CURETTAGE  .    Surgeon(s):  Santosh Almeida MD    Assistant:  * No surgical staff found *    Anesthesia: General    Estimated Blood Loss (mL): Minimal    Complications: None    Specimens:   ID Type Source Tests Collected by Time Destination   1 : ECC Tissue Endometrium SURGICAL PATHOLOGY Santosh Almeida MD 2/22/2024 1026    2 : ENDOMETRIAL CURETTING Tissue Endometrium SURGICAL PATHOLOGY Santosh Almeida MD 2/22/2024 1047        Implants:  * No implants in log *      Drains: * No LDAs found *    Findings: On hysteroscopy, the endometrium was fluffy appearing, but normal appearing. On the vulva, there is mild vulvar dysplasia from 12-3 o'clock on the left vulva. There is also a few small areas along the perineum at 6 o'clock.       Electronically signed by Santosh Almeida MD on 2/22/2024 at 11:14 AM

## 2024-02-22 NOTE — INTERVAL H&P NOTE
Update History & Physical    The patient's History and Physical of January 24, 2024 was reviewed with the patient and I examined the patient. There was no change. The surgical site was confirmed by the patient and me.     Plan: The risks, benefits, expected outcome, and alternative to the recommended procedure have been discussed with the patient. Patient understands and wants to proceed with the procedure.     Electronically signed by Santosh Almeida MD on 2/22/2024 at 9:22 AM

## 2024-02-22 NOTE — DISCHARGE INSTRUCTIONS
[facility] GYNECOLOGIC ONCOLOGY  A department of [facility]  [address], [address]  [place] [address]  P [phone]  F [phone]       YOUR DISCHARGE INSTRUCTIONS  After anesthesia your first meal should be light. Avoid foods that are greasy or spicy. Progress to your regular diet as tolerated.    Anesthesia Discharge Instructions    After general anesthesia or intervenous sedation, for 24 hours or while taking prescription Narcotics:  Limit your activities  Do not drive or operate hazardous machinery  If you have not urinated within 8 hours after discharge, please contact your surgeon on call.  Do not make important personal or business decisions  Do not drink alcoholic beverages    Report the following to your surgeon:  Excessive pain, swelling, redness or odor of or around the surgical area  Temperature over 100.5 degrees  Nausea and vomiting lasting longer than 4 hours or if unable to take medication  Any signs of decreased circulation or nerve impairment to extremity:  Change in color, persistent numbness, tingling, coldness or increased pain.  Any questions    Dear Ms. Eugenie Zafar,      Please review your instructions with your nurse and ask any questions so you have all the information you need to recover well at home.  If you do not feel you have everything you need to succeed and be safe after you leave the hospital, please discuss these concerns with your nurse.  As always, call for any questions at home.    Your doctor: Santosh Almeida MD   Diagnosis: Moderate dysplasia of vulva [N90.1]  Procedure:VULVAR BIOPSIES, LASER ABLATION OF VULVA, HYSTEROSCOPY, DILATION AND CURETTAGE: 10557 (CPT®)  .: 63073   Date of Discharge: 02/22/24       Take Home Medications     See Discharge Medication Review provided to you by your nurse. If you did not receive one, request this prior to your discharge.    It is important that you take your medications as they are prescribed. Your  prescriptions are sent to your pharmacy on record.  Keep your medications in the bottles provided by the pharmacist and keep a list of the medication names, dosages, times to be taken and what they are for in your wallet.   Do not take other medications without consulting your doctor.   You may take acetaminophen (Tylenol) and ibuprofen (Advil) for pain.  If this is not sufficient for your pain, take tramadol or other pain medication you were provided.  You should take a daily gentle stool softener such as a colace pill or dulcolax suppository for constipation as this is not uncommon after surgery and/or while on pain medication. If not prescribed, this can be found over the counter. If constipation persists for >24 hours you should take a dose of Milk of Magnesia. Call if your constipation continues.      Activity    If possible, have someone with you at all times until you feel stable.  Gradually increase your activity each day. There are generally no restrictions on walking, climbing stairs or riding in a car.  Walk at least 4 times per day.  Walking will help reduce the risk of blood clots and constipation.  Showers are okay. If you have an incision, no tub bathing/swimming for two weeks.    No driving while on narcotic pain medication.       Incision    You should expect some discomfort in the area of your incision, particularly as you increase your activity. If you notice an area of increasing redness or new drainage, please call your doctor.      Causes For Concern    If any of the following occur, please call our office and speak with the Nurse/aid who will help you with your problem or ask the doctor to call you.    Problems with the incision, including increasing pain, swelling, redness or drainage.   Inability to pass urine   Fever or chills and a temperature >101F  Constipation (no bowel movement for three days).   Diarrhea (more than three watery stools within 24 hours).   Excessive vaginal or wound

## 2024-02-27 NOTE — OP NOTE
Operative Note      Patient: Eugenie Zafar  YOB: 1980  MRN: [id number]    Date of Procedure: 2/22/2024    Pre-Op Diagnosis Codes:     * Moderate dysplasia of vulva [N90.1] Thickened endometrium    Post-Op Diagnosis: Same       Procedure(s):  VULVAR BIOPSIES, LASER ABLATION OF VULVA, HYSTEROSCOPY, DILATION AND CURETTAGE  .    Surgeon(s):  Santosh Almeida MD    Assistant:   * No surgical staff found *    Anesthesia: General    Estimated Blood Loss (mL): Minimal    Complications: None    Specimens:   ID Type Source Tests Collected by Time Destination   1 : ECC Tissue Endometrium SURGICAL PATHOLOGY Santosh Almeida MD 2/22/2024 1026    2 : ENDOMETRIAL CURETTING Tissue Endometrium SURGICAL PATHOLOGY Santosh Almeida MD 2/22/2024 1047        Implants:  * No implants in log *      Drains: * No LDAs found *    Findings: On hysteroscopy, the endometrium was fluffy appearing, but normal appearing. On the vulva, there is mild vulvar dysplasia from 12-3 o'clock on the left vulva. There is also a few small areas along the perineum at 6 o'clock.        Detailed Description of Procedure:   Gynecologic Oncology Operative Report    Operative report: After informed consent was obtained, the patient was taken to the operating room where anesthesia was administered and deemed adequate. The patient was positioned in the dorsal lithotomy position in Yvan stirrups. Time-out was performed. The patient was prepped and draped in the normal sterile fashion. In-and-out urethral catheterization was performed. The patient was examined under anesthesia and the above findings were noted.     A bivalve speculum was placed in the patient's vagina. The anterior lip of the cervix was visualized and grasped with a single-toothed tenaculum. Using Singh dilators, the cervix was dilated with #25 without difficulty. The uterus was sounded to 8-cm. Using saline as the insufflating medium, the hysteroscope was

## 2024-03-04 DIAGNOSIS — N90.0 MILD VULVAR DYSPLASIA: Primary | ICD-10-CM

## 2024-04-01 NOTE — PROGRESS NOTES
Post op Visit, surgery was on 2/22/24    1. Have you been to the ER, urgent care clinic since your last visit?  Hospitalized since your last visit?  Yes, surgery with Dr. Almeida on 2/22/24    2. Have you seen or consulted any other health care providers outside of the [facility] since your last visit?  Include any pap smears or colon screening.   no

## 2024-04-03 ENCOUNTER — OFFICE VISIT (OUTPATIENT)
Age: 44
End: 2024-04-03

## 2024-04-03 VITALS
HEIGHT: 60 IN | SYSTOLIC BLOOD PRESSURE: 109 MMHG | DIASTOLIC BLOOD PRESSURE: 70 MMHG | HEART RATE: 121 BPM | BODY MASS INDEX: 23.29 KG/M2 | WEIGHT: 118.6 LBS

## 2024-04-03 DIAGNOSIS — N90.0 MILD VULVAR DYSPLASIA: Primary | ICD-10-CM

## 2024-04-03 PROCEDURE — 99024 POSTOP FOLLOW-UP VISIT: CPT | Performed by: OBSTETRICS & GYNECOLOGY

## 2024-04-03 NOTE — PROGRESS NOTES
[facility] GYNECOLOGIC ONCOLOGY  [address] [place] [address], [address]  [place] [address]  P [phone]  F [phone]    Office Note  Patient ID:  Name:  Eugenie Zafar  MRN:  [id number]  :  1980/40 y.o.  Date:  10/20/2021      HISTORY OF PRESENT ILLNESS:  Ms. Eugenie Zafar is a 43 y.o.   female with GISSELL-3 s/p LEEP on 5/15/19 with positive ectocervical margins. Negative ECC. F/u pap and ECC in 2019 were normal. Pap normal 2020, 2020.    Presents back today for repeat pap smear. Last seen in 2020. At that time she had reported intermenstrual spotting for the last few months, which has continued. Originally we had scheduled a surgery, but this has been delayed because of Covid and insurance issues. She is now ready to proceed with surgery.     On 9/10/2021 underwent Exam under anesthesia, Hysteroscopy, dilation and curettage with Myosure, extensive laser ablation of the vulva. Final pathology consistent with TERRELL-3 and benign secretory endometrium.     Patient has been lost to follow-up since 2021.  Reports that she is concerned for recurrence of her dysplasia on her vulva on the left side.  Patient also reports abnormal menses, with bleeding every 2 weeks and worsening cramping.  She reports this has been going on for the past few months.  Denies all other complaints.    Initial History:  Ms. Eugenie Zafar is a 40 y.o.  premenopausal female who presents in consultation from Dr. Khoury for high-grade cervical dysplasia (GISSELL-3).    On 5/15/19, the patient underwent with Dr. Khoury a LEEP/CKC with final pathology consistent with GISSELL-3 as per below. Dr. Khoury discussed ASCCP guidelines regarding GISSELL-3 with recommendations for repeat pap/ECC in 4-6 months versus CKC. The patient presents for second opinion today to discuss possible hysterectomy. She reports a long history of abnormal pap smears, but reports this is the first procedure to her cervix. She is not desired in maintaining

## 2024-07-13 ENCOUNTER — APPOINTMENT (OUTPATIENT)
Facility: HOSPITAL | Age: 44
End: 2024-07-13
Payer: COMMERCIAL

## 2024-07-13 ENCOUNTER — OFFICE VISIT (OUTPATIENT)
Age: 44
End: 2024-07-13

## 2024-07-13 ENCOUNTER — HOSPITAL ENCOUNTER (INPATIENT)
Facility: HOSPITAL | Age: 44
LOS: 3 days | Discharge: HOME | End: 2024-07-16
Attending: STUDENT IN AN ORGANIZED HEALTH CARE EDUCATION/TRAINING PROGRAM | Admitting: INTERNAL MEDICINE
Payer: COMMERCIAL

## 2024-07-13 VITALS
OXYGEN SATURATION: 98 % | SYSTOLIC BLOOD PRESSURE: 131 MMHG | BODY MASS INDEX: 21.17 KG/M2 | RESPIRATION RATE: 18 BRPM | TEMPERATURE: 99 F | HEART RATE: 107 BPM | DIASTOLIC BLOOD PRESSURE: 84 MMHG | WEIGHT: 107.8 LBS | HEIGHT: 60 IN

## 2024-07-13 DIAGNOSIS — N89.8 VAGINAL DISCHARGE: ICD-10-CM

## 2024-07-13 DIAGNOSIS — R50.9 FEVER, UNSPECIFIED FEVER CAUSE: Primary | ICD-10-CM

## 2024-07-13 DIAGNOSIS — R00.0 TACHYCARDIA: ICD-10-CM

## 2024-07-13 DIAGNOSIS — R10.84 GENERALIZED ABDOMINAL PAIN: ICD-10-CM

## 2024-07-13 DIAGNOSIS — N30.00 ACUTE CYSTITIS WITHOUT HEMATURIA: Primary | ICD-10-CM

## 2024-07-13 DIAGNOSIS — N30.00 ACUTE CYSTITIS WITHOUT HEMATURIA: ICD-10-CM

## 2024-07-13 PROBLEM — A41.9 SEPSIS (HCC): Status: ACTIVE | Noted: 2024-07-13

## 2024-07-13 LAB
ALBUMIN SERPL-MCNC: 3.8 G/DL (ref 3.5–5)
ALBUMIN/GLOB SERPL: 1.1 (ref 1.1–2.2)
ALP SERPL-CCNC: 69 U/L (ref 45–117)
ALT SERPL-CCNC: 17 U/L (ref 12–78)
ANION GAP SERPL CALC-SCNC: 8 MMOL/L (ref 5–15)
APPEARANCE UR: ABNORMAL
AST SERPL-CCNC: 13 U/L (ref 15–37)
BACTERIA URNS QL MICRO: ABNORMAL /HPF
BASOPHILS # BLD: 0 K/UL (ref 0–0.1)
BASOPHILS NFR BLD: 0 % (ref 0–1)
BILIRUB SERPL-MCNC: 0.3 MG/DL (ref 0.2–1)
BILIRUB UR QL: NEGATIVE
BILIRUBIN, URINE, POC: ABNORMAL
BLOOD URINE, POC: NEGATIVE
BUN SERPL-MCNC: 9 MG/DL (ref 6–20)
BUN/CREAT SERPL: 13 (ref 12–20)
CALCIUM SERPL-MCNC: 8.9 MG/DL (ref 8.5–10.1)
CHLORIDE SERPL-SCNC: 103 MMOL/L (ref 97–108)
CLUE CELLS VAG QL WET PREP: NORMAL
CO2 SERPL-SCNC: 22 MMOL/L (ref 21–32)
COLOR UR: ABNORMAL
COMMENT:: NORMAL
CREAT SERPL-MCNC: 0.69 MG/DL (ref 0.55–1.02)
DIFFERENTIAL METHOD BLD: ABNORMAL
EOSINOPHIL # BLD: 0 K/UL (ref 0–0.4)
EOSINOPHIL NFR BLD: 0 % (ref 0–7)
EPITH CASTS URNS QL MICRO: ABNORMAL /LPF
ERYTHROCYTE [DISTWIDTH] IN BLOOD BY AUTOMATED COUNT: 14.3 % (ref 11.5–14.5)
GLOBULIN SER CALC-MCNC: 3.5 G/DL (ref 2–4)
GLUCOSE SERPL-MCNC: 99 MG/DL (ref 65–100)
GLUCOSE UR STRIP.AUTO-MCNC: NEGATIVE MG/DL
GLUCOSE URINE, POC: NEGATIVE
HCG SERPL-ACNC: <1 MIU/ML (ref 0–6)
HCT VFR BLD AUTO: 40.6 % (ref 35–47)
HGB BLD-MCNC: 13.7 G/DL (ref 11.5–16)
HGB UR QL STRIP: NEGATIVE
IMM GRANULOCYTES # BLD AUTO: 0 K/UL
IMM GRANULOCYTES NFR BLD AUTO: 0 %
KETONES UR QL STRIP.AUTO: >80 MG/DL
KETONES, URINE, POC: 100
LACTATE SERPL-SCNC: 0.8 MMOL/L (ref 0.4–2)
LEUKOCYTE ESTERASE UR QL STRIP.AUTO: ABNORMAL
LEUKOCYTE ESTERASE, URINE, POC: ABNORMAL
LIPASE SERPL-CCNC: 41 U/L (ref 13–75)
LYMPHOCYTES # BLD: 1.7 K/UL (ref 0.8–3.5)
LYMPHOCYTES NFR BLD: 23 % (ref 12–49)
MAGNESIUM SERPL-MCNC: 1.6 MG/DL (ref 1.6–2.4)
MCH RBC QN AUTO: 29.5 PG (ref 26–34)
MCHC RBC AUTO-ENTMCNC: 33.7 G/DL (ref 30–36.5)
MCV RBC AUTO: 87.5 FL (ref 80–99)
MONOCYTES # BLD: 0.3 K/UL (ref 0–1)
MONOCYTES NFR BLD: 4 % (ref 5–13)
NEUTS BAND NFR BLD MANUAL: 1 % (ref 0–6)
NEUTS SEG # BLD: 5.6 K/UL (ref 1.8–8)
NEUTS SEG NFR BLD: 72 % (ref 32–75)
NITRITE UR QL STRIP.AUTO: NEGATIVE
NITRITE, URINE, POC: NEGATIVE
NRBC # BLD: 0 K/UL (ref 0–0.01)
NRBC BLD-RTO: 0 PER 100 WBC
PH UR STRIP: 6 (ref 5–8)
PH, URINE, POC: 6 (ref 4.6–8)
PLATELET # BLD AUTO: 160 K/UL (ref 150–400)
PMV BLD AUTO: 10.3 FL (ref 8.9–12.9)
POTASSIUM SERPL-SCNC: 3.8 MMOL/L (ref 3.5–5.1)
PROT SERPL-MCNC: 7.3 G/DL (ref 6.4–8.2)
PROT UR STRIP-MCNC: 100 MG/DL
PROTEIN,URINE, POC: 100
RBC # BLD AUTO: 4.64 M/UL (ref 3.8–5.2)
RBC #/AREA URNS HPF: ABNORMAL /HPF (ref 0–5)
RBC MORPH BLD: ABNORMAL
SODIUM SERPL-SCNC: 133 MMOL/L (ref 136–145)
SP GR UR REFRACTOMETRY: 1.03 (ref 1–1.03)
SPECIFIC GRAVITY, URINE, POC: 1.03 (ref 1–1.03)
SPECIMEN HOLD: NORMAL
T VAGINALIS VAG QL WET PREP: NORMAL
URINALYSIS CLARITY, POC: CLEAR
URINALYSIS COLOR, POC: YELLOW
URINE CULTURE IF INDICATED: ABNORMAL
UROBILINOGEN UR QL STRIP.AUTO: 1 EU/DL (ref 0.2–1)
UROBILINOGEN, POC: ABNORMAL
WBC # BLD AUTO: 7.6 K/UL (ref 3.6–11)
WBC MORPH BLD: ABNORMAL
WBC URNS QL MICRO: ABNORMAL /HPF (ref 0–4)
YEAST: NORMAL

## 2024-07-13 PROCEDURE — 2580000003 HC RX 258

## 2024-07-13 PROCEDURE — 83605 ASSAY OF LACTIC ACID: CPT

## 2024-07-13 PROCEDURE — 96374 THER/PROPH/DIAG INJ IV PUSH: CPT

## 2024-07-13 PROCEDURE — 85025 COMPLETE CBC W/AUTO DIFF WBC: CPT

## 2024-07-13 PROCEDURE — 80053 COMPREHEN METABOLIC PANEL: CPT

## 2024-07-13 PROCEDURE — 1100000000 HC RM PRIVATE

## 2024-07-13 PROCEDURE — 87210 SMEAR WET MOUNT SALINE/INK: CPT

## 2024-07-13 PROCEDURE — 36415 COLL VENOUS BLD VENIPUNCTURE: CPT

## 2024-07-13 PROCEDURE — 99285 EMERGENCY DEPT VISIT HI MDM: CPT

## 2024-07-13 PROCEDURE — 74177 CT ABD & PELVIS W/CONTRAST: CPT

## 2024-07-13 PROCEDURE — 6360000002 HC RX W HCPCS

## 2024-07-13 PROCEDURE — 84702 CHORIONIC GONADOTROPIN TEST: CPT

## 2024-07-13 PROCEDURE — 6360000004 HC RX CONTRAST MEDICATION

## 2024-07-13 PROCEDURE — 83690 ASSAY OF LIPASE: CPT

## 2024-07-13 PROCEDURE — 87040 BLOOD CULTURE FOR BACTERIA: CPT

## 2024-07-13 PROCEDURE — 76856 US EXAM PELVIC COMPLETE: CPT

## 2024-07-13 PROCEDURE — 81001 URINALYSIS AUTO W/SCOPE: CPT

## 2024-07-13 PROCEDURE — 83735 ASSAY OF MAGNESIUM: CPT

## 2024-07-13 PROCEDURE — 76830 TRANSVAGINAL US NON-OB: CPT

## 2024-07-13 RX ORDER — KETOROLAC TROMETHAMINE 30 MG/ML
30 INJECTION, SOLUTION INTRAMUSCULAR; INTRAVENOUS
Status: COMPLETED | OUTPATIENT
Start: 2024-07-13 | End: 2024-07-13

## 2024-07-13 RX ORDER — ONDANSETRON 2 MG/ML
4 INJECTION INTRAMUSCULAR; INTRAVENOUS ONCE
Status: DISCONTINUED | OUTPATIENT
Start: 2024-07-13 | End: 2024-07-16 | Stop reason: HOSPADM

## 2024-07-13 RX ORDER — 0.9 % SODIUM CHLORIDE 0.9 %
1000 INTRAVENOUS SOLUTION INTRAVENOUS ONCE
Status: COMPLETED | OUTPATIENT
Start: 2024-07-13 | End: 2024-07-13

## 2024-07-13 RX ORDER — SULFAMETHOXAZOLE AND TRIMETHOPRIM 800; 160 MG/1; MG/1
1 TABLET ORAL 2 TIMES DAILY
Qty: 14 TABLET | Refills: 0 | Status: ON HOLD | OUTPATIENT
Start: 2024-07-13 | End: 2024-07-20

## 2024-07-13 RX ADMIN — KETOROLAC TROMETHAMINE 30 MG: 30 INJECTION, SOLUTION INTRAMUSCULAR at 18:35

## 2024-07-13 RX ADMIN — IOPAMIDOL 100 ML: 755 INJECTION, SOLUTION INTRAVENOUS at 19:35

## 2024-07-13 RX ADMIN — SODIUM CHLORIDE 1000 MG: 9 INJECTION INTRAMUSCULAR; INTRAVENOUS; SUBCUTANEOUS at 20:40

## 2024-07-13 RX ADMIN — SODIUM CHLORIDE 1000 ML: 9 INJECTION, SOLUTION INTRAVENOUS at 18:35

## 2024-07-13 ASSESSMENT — PAIN DESCRIPTION - DESCRIPTORS
DESCRIPTORS: ACHING
DESCRIPTORS: ACHING

## 2024-07-13 ASSESSMENT — PAIN - FUNCTIONAL ASSESSMENT
PAIN_FUNCTIONAL_ASSESSMENT: ACTIVITIES ARE NOT PREVENTED
PAIN_FUNCTIONAL_ASSESSMENT: 0-10

## 2024-07-13 ASSESSMENT — PAIN DESCRIPTION - LOCATION
LOCATION: ABDOMEN
LOCATION: ABDOMEN

## 2024-07-13 ASSESSMENT — PAIN DESCRIPTION - ORIENTATION
ORIENTATION: LOWER
ORIENTATION: LOWER

## 2024-07-13 ASSESSMENT — PAIN SCALES - GENERAL
PAINLEVEL_OUTOF10: 5
PAINLEVEL_OUTOF10: 8

## 2024-07-13 ASSESSMENT — PAIN DESCRIPTION - PAIN TYPE: TYPE: ACUTE PAIN

## 2024-07-13 NOTE — PATIENT INSTRUCTIONS
Thank you for visiting [facility] today.    -Increase fluid intake.  Some people say cranberry juice helps with discomfort.  -Don't hold your urine.  Urinate when you feel like you need to.  -Wipe from front to back after bowel movements.  -If sexually active, urinate after intercourse and use enough lubrication.   -Avoid taking bubble baths.  -Wear loose fitting clothing.  -Decrease caffeine or carbonated drinks  -Repeat urine screen in two weeks  -Take antibiotic with food and consider probiotic    Follow up with your PCP if symptoms persist or worsen.    Please go to the Emergency Department immediately for symptoms of a urinary tract infection along with any of the following:  fever with severe and sudden shaking (rigors), nausea, vomiting and the inability to keep down clear fluids or medications.

## 2024-07-13 NOTE — PROGRESS NOTES
Subjective     Chief Complaint   Patient presents with    Fever    Pelvic Pain     Symptoms for 4 days       Patient is 43 year old female presenting with fever of of 101 for the past three days.  She has been taking ibuprofen/tylenol with relief. She denies dysuria, urgency or frequency.  She endorses white vaginal discharge.  Discharge does not have odor.  She reports \"gush\" of discharge this morning when she stood up.  She reports LMP was first week of July.  She has new sexual partner.  She has history of ovarian cysts.       Fever   Pertinent negatives include no urinary pain.   Pelvic Pain  The patient's primary symptoms include pelvic pain and vaginal discharge. Associated symptoms include a fever. Pertinent negatives include no chills, dysuria, frequency or urgency.       Past Medical History:   Diagnosis Date    Abnormal Pap smear     HPV last pap. Will follow up after pregnancy FOLLOW NEGATIVE     Postpartum depression     Mild 3-4 months in duration       Past Surgical History:   Procedure Laterality Date    GYN      2 children    HYSTEROSCOPY N/A 2/22/2024    VULVAR BIOPSIES, LASER ABLATION OF VULVA, HYSTEROSCOPY, DILATION AND CURETTAGE performed by Santosh Almeida MD at [facility] MAIN OR    LEEP  05/15/2019    HGSIL, GISSELL 2, GISSELL 3    OTHER SURGICAL HISTORY      ovarian cyst removed 1995    VULVA SURGERY N/A 2/22/2024    . performed by Santosh Almeida MD at [facility] MAIN OR    WISDOM TOOTH EXTRACTION         Family History   Problem Relation Age of Onset    Hypertension Mother     Hypertension Father     Mult Sclerosis Sister     Hypertension Maternal Grandmother     Heart Disease Maternal Grandmother     Cancer Maternal Grandfather         testicular, lung    Hypertension Paternal Grandmother     Cancer Paternal Grandmother 55        Breast    Cancer Paternal Grandfather         testicular, lung    Anesth Problems Neg Hx        No Known Allergies    Social History     Tobacco Use    Smoking status:

## 2024-07-13 NOTE — ED PROVIDER NOTES
[facility] EMERGENCY DEP  EMERGENCY DEPARTMENT ENCOUNTER      Pt Name: Eugenie Zafar  MRN: [id number]  Birthdate 1980  Date of evaluation: 7/13/2024  Provider: Pablo Rey PA-C    CHIEF COMPLAINT       Chief Complaint   Patient presents with    Abdominal Pain    Fever         HISTORY OF PRESENT ILLNESS   (Location/Symptom, Timing/Onset, Context/Setting, Quality, Duration, Modifying Factors, Severity)  Note limiting factors.   43-year-old female presents with complaint of abdominal pain and fever.  Patient reports that symptoms for started 3 days ago.  She reports that the pain started in her lower abdomen, however is now located diffusely over her whole abdomen.  Patient reports a history of abnormal Pap smears.  Went to urgent care earlier where she was diagnosed with a UTI.  She was started on Bactrim.  She took 1 dose.  Admits to nausea without vomiting.  Also mentions that she has been having some white vaginal discharge.  No concern for STI.  Denies chest pain, shortness of breath, urinary symptoms, changes in bowel habits.  Last took Tylenol around 3 PM.            Review of External Medical Records:     Nursing Notes were reviewed.    REVIEW OF SYSTEMS    (2-9 systems for level 4, 10 or more for level 5)     Review of Systems    Except as noted above the remainder of the review of systems was reviewed and negative.       PAST MEDICAL HISTORY     Past Medical History:   Diagnosis Date    Abnormal Pap smear     HPV last pap. Will follow up after pregnancy FOLLOW NEGATIVE     Postpartum depression     Mild 3-4 months in duration         SURGICAL HISTORY       Past Surgical History:   Procedure Laterality Date    GYN      2 children    HYSTEROSCOPY N/A 2/22/2024    VULVAR BIOPSIES, LASER ABLATION OF VULVA, HYSTEROSCOPY, DILATION AND CURETTAGE performed by Santosh Almeida MD at [facility] MAIN OR    LEEP  05/15/2019    HGSIL, GISSELL 2, GISSELL 3    OTHER SURGICAL HISTORY      ovarian cyst removed 1995     VULVA SURGERY N/A 2/22/2024    . performed by Santosh Almeida MD at [facility] MAIN OR    WISDOM TOOTH EXTRACTION           CURRENT MEDICATIONS       Previous Medications    IBUPROFEN (ADVIL;MOTRIN) 200 MG TABLET    Take by mouth as needed    SILVER SULFADIAZINE (SILVADENE) 1 % CREAM    Apply topically daily.    SULFAMETHOXAZOLE-TRIMETHOPRIM (BACTRIM DS;SEPTRA DS) 800-160 MG PER TABLET    Take 1 tablet by mouth 2 times daily for 7 days       ALLERGIES     Patient has no known allergies.    FAMILY HISTORY       Family History   Problem Relation Age of Onset    Hypertension Mother     Hypertension Father     Mult Sclerosis Sister     Hypertension Maternal Grandmother     Heart Disease Maternal Grandmother     Cancer Maternal Grandfather         testicular, lung    Hypertension Paternal Grandmother     Cancer Paternal Grandmother 55        Breast    Cancer Paternal Grandfather         testicular, lung    Anesth Problems Neg Hx           SOCIAL HISTORY       Social History     Socioeconomic History    Marital status: Legally      Spouse name: None    Number of children: None    Years of education: None    Highest education level: None   Tobacco Use    Smoking status: Every Day     Current packs/day: 0.50     Average packs/day: 0.5 packs/day for 27.5 years (13.8 ttl pk-yrs)     Types: Cigarettes     Start date: 1997    Smokeless tobacco: Never   Vaping Use    Vaping Use: Never used   Substance and Sexual Activity    Alcohol use: Not Currently    Drug use: No           PHYSICAL EXAM    (up to 7 for level 4, 8 or more for level 5)     ED Triage Vitals [07/13/24 1740]   BP Temp Temp Source Pulse Respirations SpO2 Height Weight - Scale   (!) 148/86 (!) 102.3 °F (39.1 °C) Oral (!) 145 20 96 % -- 49.7 kg (109 lb 9.1 oz)       Body mass index is 21.4 kg/m².    Physical Exam  Vitals and nursing note reviewed.   Constitutional:       General: She is not in acute distress.     Appearance: Normal appearance. She is not

## 2024-07-14 ENCOUNTER — APPOINTMENT (OUTPATIENT)
Facility: HOSPITAL | Age: 44
End: 2024-07-14
Payer: COMMERCIAL

## 2024-07-14 LAB
ALBUMIN SERPL-MCNC: 2.6 G/DL (ref 3.5–5)
ALBUMIN/GLOB SERPL: 0.9 (ref 1.1–2.2)
ALP SERPL-CCNC: 50 U/L (ref 45–117)
ALT SERPL-CCNC: 17 U/L (ref 12–78)
AMPHET UR QL SCN: NEGATIVE
ANION GAP SERPL CALC-SCNC: 4 MMOL/L (ref 5–15)
AST SERPL-CCNC: 13 U/L (ref 15–37)
BARBITURATES UR QL SCN: NEGATIVE
BASOPHILS # BLD: 0 K/UL (ref 0–0.1)
BASOPHILS NFR BLD: 0 % (ref 0–1)
BENZODIAZ UR QL: NEGATIVE
BILIRUB SERPL-MCNC: 0.3 MG/DL (ref 0.2–1)
BUN SERPL-MCNC: 10 MG/DL (ref 6–20)
BUN/CREAT SERPL: 16 (ref 12–20)
CALCIUM SERPL-MCNC: 8 MG/DL (ref 8.5–10.1)
CANNABINOIDS UR QL SCN: POSITIVE
CHLORIDE SERPL-SCNC: 109 MMOL/L (ref 97–108)
CO2 SERPL-SCNC: 23 MMOL/L (ref 21–32)
COCAINE UR QL SCN: NEGATIVE
CREAT SERPL-MCNC: 0.62 MG/DL (ref 0.55–1.02)
DIFFERENTIAL METHOD BLD: ABNORMAL
EOSINOPHIL # BLD: 0 K/UL (ref 0–0.4)
EOSINOPHIL NFR BLD: 0 % (ref 0–7)
ERYTHROCYTE [DISTWIDTH] IN BLOOD BY AUTOMATED COUNT: 14.3 % (ref 11.5–14.5)
GLOBULIN SER CALC-MCNC: 2.9 G/DL (ref 2–4)
GLUCOSE SERPL-MCNC: 94 MG/DL (ref 65–100)
HCT VFR BLD AUTO: 34.1 % (ref 35–47)
HGB BLD-MCNC: 11.7 G/DL (ref 11.5–16)
IMM GRANULOCYTES # BLD AUTO: 0 K/UL
IMM GRANULOCYTES NFR BLD AUTO: 0 %
LYMPHOCYTES # BLD: 1.9 K/UL (ref 0.8–3.5)
LYMPHOCYTES NFR BLD: 31 % (ref 12–49)
Lab: ABNORMAL
MAGNESIUM SERPL-MCNC: 1.6 MG/DL (ref 1.6–2.4)
MCH RBC QN AUTO: 29.8 PG (ref 26–34)
MCHC RBC AUTO-ENTMCNC: 34.3 G/DL (ref 30–36.5)
MCV RBC AUTO: 86.8 FL (ref 80–99)
METHADONE UR QL: NEGATIVE
MONOCYTES # BLD: 0.5 K/UL (ref 0–1)
MONOCYTES NFR BLD: 8 % (ref 5–13)
NEUTS SEG # BLD: 3.7 K/UL (ref 1.8–8)
NEUTS SEG NFR BLD: 61 % (ref 32–75)
NRBC # BLD: 0 K/UL (ref 0–0.01)
NRBC BLD-RTO: 0 PER 100 WBC
OPIATES UR QL: NEGATIVE
PCP UR QL: NEGATIVE
PHOSPHATE SERPL-MCNC: 2.4 MG/DL (ref 2.6–4.7)
PLATELET # BLD AUTO: 119 K/UL (ref 150–400)
PMV BLD AUTO: 10 FL (ref 8.9–12.9)
POTASSIUM SERPL-SCNC: 3.8 MMOL/L (ref 3.5–5.1)
PROT SERPL-MCNC: 5.5 G/DL (ref 6.4–8.2)
RBC # BLD AUTO: 3.93 M/UL (ref 3.8–5.2)
RBC MORPH BLD: ABNORMAL
SODIUM SERPL-SCNC: 136 MMOL/L (ref 136–145)
SPECIMEN HOLD: NORMAL
TSH SERPL DL<=0.05 MIU/L-ACNC: 0.35 UIU/ML (ref 0.36–3.74)
WBC # BLD AUTO: 6.1 K/UL (ref 3.6–11)
WBC MORPH BLD: ABNORMAL

## 2024-07-14 PROCEDURE — 6360000002 HC RX W HCPCS: Performed by: INTERNAL MEDICINE

## 2024-07-14 PROCEDURE — 2580000003 HC RX 258: Performed by: INTERNAL MEDICINE

## 2024-07-14 PROCEDURE — 87086 URINE CULTURE/COLONY COUNT: CPT

## 2024-07-14 PROCEDURE — 71045 X-RAY EXAM CHEST 1 VIEW: CPT

## 2024-07-14 PROCEDURE — 84100 ASSAY OF PHOSPHORUS: CPT

## 2024-07-14 PROCEDURE — 1100000000 HC RM PRIVATE

## 2024-07-14 PROCEDURE — 6370000000 HC RX 637 (ALT 250 FOR IP): Performed by: INTERNAL MEDICINE

## 2024-07-14 PROCEDURE — 85025 COMPLETE CBC W/AUTO DIFF WBC: CPT

## 2024-07-14 PROCEDURE — 80307 DRUG TEST PRSMV CHEM ANLYZR: CPT

## 2024-07-14 PROCEDURE — 84443 ASSAY THYROID STIM HORMONE: CPT

## 2024-07-14 PROCEDURE — 80053 COMPREHEN METABOLIC PANEL: CPT

## 2024-07-14 PROCEDURE — 36415 COLL VENOUS BLD VENIPUNCTURE: CPT

## 2024-07-14 PROCEDURE — 83735 ASSAY OF MAGNESIUM: CPT

## 2024-07-14 RX ORDER — 0.9 % SODIUM CHLORIDE 0.9 %
500 INTRAVENOUS SOLUTION INTRAVENOUS ONCE
Status: COMPLETED | OUTPATIENT
Start: 2024-07-14 | End: 2024-07-14

## 2024-07-14 RX ORDER — SODIUM CHLORIDE 9 MG/ML
INJECTION, SOLUTION INTRAVENOUS PRN
Status: DISCONTINUED | OUTPATIENT
Start: 2024-07-14 | End: 2024-07-16 | Stop reason: HOSPADM

## 2024-07-14 RX ORDER — ENOXAPARIN SODIUM 100 MG/ML
30 INJECTION SUBCUTANEOUS DAILY
Status: DISCONTINUED | OUTPATIENT
Start: 2024-07-14 | End: 2024-07-16

## 2024-07-14 RX ORDER — SODIUM CHLORIDE, SODIUM LACTATE, POTASSIUM CHLORIDE, CALCIUM CHLORIDE 600; 310; 30; 20 MG/100ML; MG/100ML; MG/100ML; MG/100ML
INJECTION, SOLUTION INTRAVENOUS CONTINUOUS
Status: DISCONTINUED | OUTPATIENT
Start: 2024-07-14 | End: 2024-07-15

## 2024-07-14 RX ORDER — SODIUM CHLORIDE 0.9 % (FLUSH) 0.9 %
5-40 SYRINGE (ML) INJECTION EVERY 12 HOURS SCHEDULED
Status: DISCONTINUED | OUTPATIENT
Start: 2024-07-14 | End: 2024-07-16 | Stop reason: HOSPADM

## 2024-07-14 RX ORDER — MORPHINE SULFATE 2 MG/ML
2 INJECTION, SOLUTION INTRAMUSCULAR; INTRAVENOUS EVERY 4 HOURS PRN
Status: DISCONTINUED | OUTPATIENT
Start: 2024-07-14 | End: 2024-07-16 | Stop reason: HOSPADM

## 2024-07-14 RX ORDER — ACETAMINOPHEN 650 MG/1
650 SUPPOSITORY RECTAL EVERY 6 HOURS PRN
Status: DISCONTINUED | OUTPATIENT
Start: 2024-07-14 | End: 2024-07-16 | Stop reason: HOSPADM

## 2024-07-14 RX ORDER — ONDANSETRON 2 MG/ML
4 INJECTION INTRAMUSCULAR; INTRAVENOUS EVERY 6 HOURS PRN
Status: DISCONTINUED | OUTPATIENT
Start: 2024-07-14 | End: 2024-07-16 | Stop reason: HOSPADM

## 2024-07-14 RX ORDER — SODIUM CHLORIDE 0.9 % (FLUSH) 0.9 %
5-40 SYRINGE (ML) INJECTION PRN
Status: DISCONTINUED | OUTPATIENT
Start: 2024-07-14 | End: 2024-07-16 | Stop reason: HOSPADM

## 2024-07-14 RX ORDER — OXYCODONE HYDROCHLORIDE 5 MG/1
5 TABLET ORAL EVERY 4 HOURS PRN
Status: DISCONTINUED | OUTPATIENT
Start: 2024-07-14 | End: 2024-07-16 | Stop reason: HOSPADM

## 2024-07-14 RX ORDER — ACETAMINOPHEN 325 MG/1
650 TABLET ORAL EVERY 6 HOURS PRN
Status: DISCONTINUED | OUTPATIENT
Start: 2024-07-14 | End: 2024-07-16 | Stop reason: HOSPADM

## 2024-07-14 RX ADMIN — ONDANSETRON 4 MG: 2 INJECTION INTRAMUSCULAR; INTRAVENOUS at 21:10

## 2024-07-14 RX ADMIN — OXYCODONE 5 MG: 5 TABLET ORAL at 06:09

## 2024-07-14 RX ADMIN — ENOXAPARIN SODIUM 30 MG: 100 INJECTION SUBCUTANEOUS at 09:18

## 2024-07-14 RX ADMIN — ACETAMINOPHEN 650 MG: 325 TABLET ORAL at 14:06

## 2024-07-14 RX ADMIN — SODIUM CHLORIDE, PRESERVATIVE FREE 40 MG: 5 INJECTION INTRAVENOUS at 12:27

## 2024-07-14 RX ADMIN — ACETAMINOPHEN 650 MG: 325 TABLET ORAL at 02:02

## 2024-07-14 RX ADMIN — SODIUM CHLORIDE, PRESERVATIVE FREE 10 ML: 5 INJECTION INTRAVENOUS at 09:13

## 2024-07-14 RX ADMIN — SODIUM CHLORIDE, PRESERVATIVE FREE 10 ML: 5 INJECTION INTRAVENOUS at 21:01

## 2024-07-14 RX ADMIN — OXYCODONE 5 MG: 5 TABLET ORAL at 02:02

## 2024-07-14 RX ADMIN — OXYCODONE 5 MG: 5 TABLET ORAL at 11:32

## 2024-07-14 RX ADMIN — SODIUM CHLORIDE, POTASSIUM CHLORIDE, SODIUM LACTATE AND CALCIUM CHLORIDE: 600; 310; 30; 20 INJECTION, SOLUTION INTRAVENOUS at 16:35

## 2024-07-14 RX ADMIN — SODIUM CHLORIDE, POTASSIUM CHLORIDE, SODIUM LACTATE AND CALCIUM CHLORIDE: 600; 310; 30; 20 INJECTION, SOLUTION INTRAVENOUS at 02:13

## 2024-07-14 RX ADMIN — ONDANSETRON 4 MG: 2 INJECTION INTRAMUSCULAR; INTRAVENOUS at 02:06

## 2024-07-14 RX ADMIN — ACETAMINOPHEN 650 MG: 325 TABLET ORAL at 21:01

## 2024-07-14 RX ADMIN — OXYCODONE 5 MG: 5 TABLET ORAL at 21:09

## 2024-07-14 RX ADMIN — WATER 1000 MG: 1 INJECTION INTRAMUSCULAR; INTRAVENOUS; SUBCUTANEOUS at 21:01

## 2024-07-14 RX ADMIN — ONDANSETRON 4 MG: 2 INJECTION INTRAMUSCULAR; INTRAVENOUS at 12:24

## 2024-07-14 RX ADMIN — SODIUM CHLORIDE 500 ML: 9 INJECTION, SOLUTION INTRAVENOUS at 04:46

## 2024-07-14 RX ADMIN — SODIUM CHLORIDE, POTASSIUM CHLORIDE, SODIUM LACTATE AND CALCIUM CHLORIDE: 600; 310; 30; 20 INJECTION, SOLUTION INTRAVENOUS at 09:12

## 2024-07-14 ASSESSMENT — PAIN SCALES - GENERAL
PAINLEVEL_OUTOF10: 0
PAINLEVEL_OUTOF10: 6
PAINLEVEL_OUTOF10: 8
PAINLEVEL_OUTOF10: 7
PAINLEVEL_OUTOF10: 8
PAINLEVEL_OUTOF10: 7
PAINLEVEL_OUTOF10: 8

## 2024-07-14 ASSESSMENT — PAIN DESCRIPTION - ORIENTATION
ORIENTATION: LOWER

## 2024-07-14 ASSESSMENT — PAIN DESCRIPTION - LOCATION
LOCATION: ABDOMEN

## 2024-07-14 ASSESSMENT — PAIN DESCRIPTION - DESCRIPTORS
DESCRIPTORS: ACHING
DESCRIPTORS: ACHING

## 2024-07-14 NOTE — ED NOTES
ED TO INPATIENT SBAR HANDOFF    Patient Name: Eugenie Zafar   :  1980  43 y.o.   MRN:  [id number]  ED Room #:  R32/R32  Family/Caregiver Present no       Situation  Code Status: Full Code     Allergies: Patient has no known allergies.  Weight: Patient Vitals for the past 96 hrs (Last 3 readings):   Weight   24 1740 49.7 kg (109 lb 9.1 oz)     Arrived from: home  Chief Complaint:   Chief Complaint   Patient presents with    Abdominal Pain    Fever     Hospital Problem/Diagnosis:  Principal Problem:    Sepsis (HCC)  Resolved Problems:    * No resolved hospital problems. *    Imaging:   CT ABDOMEN PELVIS W IV CONTRAST Additional Contrast? None   Final Result   Minimal free fluid in the pelvis. No bowel obstruction, ileus or   perforation. No intra-abdominal abscess.      Electronically signed by Sami Mills MD      US NON OB TRANSVAGINAL   Final Result   Somewhat heterogeneous endometrium may be physiologic in nature.   Ovaries are normal in appearance. Correlation with transvaginal ultrasound will   be performed for optimal evaluation of the endometrial stripe and ovaries.      Transvaginal ultrasound: Realtime sonographic imaging of the pelvis was   performed transvaginally. The uterus is normal in appearance. The endometrial   stripe measures 18 mm. It is heterogeneous in appearance. The right ovary   measures 3.4 x 1.8 x 2.4 cm and the left ovary measures 2.4 x 1.4 x 1.8 cm. They   are normal in appearance. Blood flow is documented within both ovaries. No free   fluid.       IMPRESSION: Mildly thickened somewhat heterogeneous endometrium may be   physiologic in nature, the patient does report history of ablations. Otherwise   normal pelvic ultrasound.         Electronically signed by DIANA IVY      US PELVIS COMPLETE   Final Result   Somewhat heterogeneous endometrium may be physiologic in nature.   Ovaries are normal in appearance. Correlation with transvaginal ultrasound will   be performed    Restraints no   Sitter no     Background  History:   Past Medical History:   Diagnosis Date    Abnormal Pap smear     HPV last pap. Will follow up after pregnancy FOLLOW NEGATIVE     Postpartum depression     Mild 3-4 months in duration       Assessment    Vitals/MEWS: MEWS Score: 6  Level of Consciousness: Alert (0)   Vitals:    07/13/24 1740 07/13/24 1915 07/13/24 2004 07/14/24 0030   BP: (!) 148/86 115/65 109/70 110/63   Pulse: (!) 145 (!) 119 (!) 111 (!) 110   Resp: 20 18 20 20   Temp: (!) 102.3 °F (39.1 °C) 100.2 °F (37.9 °C) 99.2 °F (37.3 °C) 99.2 °F (37.3 °C)   TempSrc: Oral Oral Oral Oral   SpO2: 96% 97%  99%   Weight: 49.7 kg (109 lb 9.1 oz)        DI:   Predictive Model Details          22 (Normal)  Factor Value    Calculated 7/14/2024 00:36 34% Age 43 years old    Deterioration Index Model 27% Respiratory rate 20     22% Pulse 110     11% Sodium abnormal (133 mmol/L)     3% Pulse oximetry 99 %     1% Temperature 99.2 °F (37.3 °C)     1% Potassium 3.8 mmol/L     1% WBC count 7.6 K/uL     0% Systolic 110     0% Hematocrit 40.6 %       FiO2 (%): N/A  O2 Flow Rate: O2 Device: None (Room air)    Cardiac Rhythm:    Pain Scale: Pain Assessment  Pain Assessment: 0-10  Pain Level: 5  Patient's Stated Pain Goal: 0 - No pain  Pain Location: Abdomen  Pain Orientation: Lower  Pain Descriptors: Aching  Functional Pain Assessment: Activities are not prevented  Pain Type: Acute pain  Non-Pharmaceutical Pain Intervention(s): Rest  Last documented pain score (0-10 scale) Pain Level: 5  Last documented pain medication administered: Toradol @ 1835 7/13     Mental Status: oriented, alert, coherent, logical, thought processes intact, and able to concentrate and follow conversation  Orientation Level:    NIH Score:    C-SSRS: Risk of Suicide: No Risk  Bedside swallow:    [place] Coma Scale (GCS): Doug Coma Scale  Eye Opening: Spontaneous  Best Verbal Response: Oriented  Best Motor Response: Obeys commands  Doug Coma

## 2024-07-14 NOTE — PROGRESS NOTES
[facility] Adult  Hospitalist Group                                                                                          Hospitalist Progress Note  Nora Reyes MD  Answering service: [phone] OR [phone] from in house phone        Date of Service:  2024  NAME:  Eugenie Zafar  :  1980  MRN:  [id number]       Admission Summary:   Eugenie Zafar is a 43 y.o. female with past medical history significant for dyslipidemia presented at the emergency room with abdominal pain and fever.  Patient symptoms started about 3 days ago.  The pain is located at the right lower quadrant, sharp, no radiation, 8/10 in severity, no known aggravating or relieving factors.  The fever is with rigors and chills.  The abdominal pain is associated with nausea but no vomiting, diarrhea and constipation.  Patient initially went to urgent care center where she was diagnosed with urinary tract infection and started on Bactrim.  Patient took 1 dose of the Bactrim and came to the emergency room because of worsening symptoms.  CT scan of the abdomen and pelvis done on arrival at the emergency room did not show acute pathology.  She presented with fever of 102.3, tachycardia and infected urine raising concern for sepsis.  Patient was started on antibiotics and referred to the hospitalist service for admission.       Interval history / Subjective:   Patient seen and examined.   She continues to complain of abdominal pain.  The location seems to have migrated from RLQ to mid epigastric area.  It is not sharp.  She has a poor appetite and poor PO intake, but drinking enough.  Fevers better.   Chart reviewed, results of CT and plan of care discussed with patient.      Assessment & Plan:           Sepsis, POA;  Suspect Right pyelonephritis;  -continue IV Abx;   -continue IVFs;   -follow results of urine and blood cx;     Dyslipidemia:  -diet controlled;     Tobacco use disorder:  -patient counseled to quit

## 2024-07-14 NOTE — H&P
History and Physical    Date of Service:  7/14/2024  Primary Care Provider: [facility], Pcp  Source of information: The patient and review of EMR    Chief Complaint: Abdominal Pain and Fever      History of Presenting Illness:   Eugenie Zafar is a 43 y.o. female with past medical history significant for dyslipidemia presented at the emergency room with abdominal pain and fever.  Patient symptoms started about 3 days ago.  The pain is located at the right lower quadrant, sharp, no radiation, 8/10 in severity, no known aggravating or relieving factors.  The fever is with rigors and chills.  The abdominal pain is associated with nausea but no vomiting, diarrhea and constipation.  Patient initially went to urgent care center where she was diagnosed with urinary tract infection and started on Bactrim.  Patient took 1 dose of the Bactrim and came to the emergency room because of worsening symptoms.  CT scan of the abdomen and pelvis done on arrival at the emergency room did not show acute pathology.  She presented with fever of 102.3, tachycardia and infected urine raising concern for sepsis.  Patient was started on antibiotics and referred to the hospitalist service for admission.       REVIEW OF SYSTEMS:  REVIEW OF SYSTEMS:  HEAD, EYES, EARS, NOSE AND THROAT:  No headache.  No dizziness.  No blurring of vision.  No photophobia.  RESPIRATORY SYSTEM:  No shortness of breath.  No cough.  No hemoptysis.  CARDIOVASCULAR SYSTEM:  No chest pain.  No orthopnea.  No palpitations.  GASTROINTESTINAL SYSTEM: This is positive for abdominal pain and nausea no vomiting.  No diarrhea.  No constipation.  GENITOURINARY SYSTEM:  No dysuria, no urgency, and no frequency.     All other systems are reviewed and they are negative.        Past Medical History:   Diagnosis Date    Abnormal Pap smear     HPV last pap. Will follow up after pregnancy FOLLOW NEGATIVE     Postpartum depression     Mild 3-4 months in duration      Past  potential source of sepsis.  Patient started on Rocephin as stated above and will await urine and blood culture result.    3.  Dyslipidemia POA: We will continue dietary therapy.    4.  Tobacco use disorder POA: Patient advised to quit smoking.  Will place the patient on NicoDerm patch.  Will check urine drug screen    5.  Hyponatremia POA: Most likely due to volume depletion.  Fluid therapy and repeat sodium level        DIET: ADULT DIET; Regular; Low Fat/Low Chol/High Fiber/ALMA ROSA   ISOLATION PRECAUTIONS: No active isolations  CODE STATUS: Full Code   Central Line:   None  DVT PROPHYLAXIS: Lovenox  FUNCTIONAL STATUS PRIOR TO HOSPITALIZATION: Fully active and ambulatory; able to carry on all self-care without restriction.  Ambulatory status/function: By self   EARLY MOBILITY ASSESSMENT: Recommend routine ambulation while hospitalized with the assistance of nursing staff  ANTICIPATED DISCHARGE: Greater than 48 hours.  ANTICIPATED DISPOSITION: Home  EMERGENCY CONTACT/SURROGATE DECISION MAKER: Shahriar Rodriguez, leonor    CRITICAL CARE WAS PERFORMED FOR THIS ENCOUNTER: No       Signed By: Carrillo Germain MD     July 14, 2024         Please note that this dictation may have been completed with Dragon, the MDdat[employer] voice recognition software.  Quite often unanticipated grammatical, syntax, homophones, and other interpretive errors are inadvertently transcribed by the computer software.  Please disregard these errors.  Please excuse any errors that have escaped final proofreading.

## 2024-07-15 LAB
BACTERIA SPEC CULT: NORMAL
CC UR VC: NORMAL
SERVICE CMNT-IMP: NORMAL

## 2024-07-15 PROCEDURE — 2580000003 HC RX 258: Performed by: INTERNAL MEDICINE

## 2024-07-15 PROCEDURE — 6370000000 HC RX 637 (ALT 250 FOR IP): Performed by: INTERNAL MEDICINE

## 2024-07-15 PROCEDURE — 6360000002 HC RX W HCPCS: Performed by: INTERNAL MEDICINE

## 2024-07-15 PROCEDURE — 1100000000 HC RM PRIVATE

## 2024-07-15 RX ADMIN — WATER 1000 MG: 1 INJECTION INTRAMUSCULAR; INTRAVENOUS; SUBCUTANEOUS at 18:57

## 2024-07-15 RX ADMIN — SODIUM CHLORIDE, POTASSIUM CHLORIDE, SODIUM LACTATE AND CALCIUM CHLORIDE: 600; 310; 30; 20 INJECTION, SOLUTION INTRAVENOUS at 11:56

## 2024-07-15 RX ADMIN — ACETAMINOPHEN 650 MG: 325 TABLET ORAL at 07:08

## 2024-07-15 RX ADMIN — ACETAMINOPHEN 650 MG: 325 TABLET ORAL at 11:55

## 2024-07-15 RX ADMIN — ENOXAPARIN SODIUM 30 MG: 100 INJECTION SUBCUTANEOUS at 09:30

## 2024-07-15 RX ADMIN — SODIUM CHLORIDE, POTASSIUM CHLORIDE, SODIUM LACTATE AND CALCIUM CHLORIDE: 600; 310; 30; 20 INJECTION, SOLUTION INTRAVENOUS at 00:13

## 2024-07-15 RX ADMIN — SODIUM CHLORIDE, PRESERVATIVE FREE 10 ML: 5 INJECTION INTRAVENOUS at 09:30

## 2024-07-15 RX ADMIN — SODIUM CHLORIDE, PRESERVATIVE FREE 10 ML: 5 INJECTION INTRAVENOUS at 21:23

## 2024-07-15 RX ADMIN — SODIUM CHLORIDE, POTASSIUM CHLORIDE, SODIUM LACTATE AND CALCIUM CHLORIDE: 600; 310; 30; 20 INJECTION, SOLUTION INTRAVENOUS at 07:04

## 2024-07-15 RX ADMIN — SODIUM CHLORIDE, PRESERVATIVE FREE 40 MG: 5 INJECTION INTRAVENOUS at 09:30

## 2024-07-15 RX ADMIN — ACETAMINOPHEN 650 MG: 325 TABLET ORAL at 21:24

## 2024-07-15 ASSESSMENT — PAIN SCALES - GENERAL
PAINLEVEL_OUTOF10: 2
PAINLEVEL_OUTOF10: 4
PAINLEVEL_OUTOF10: 5
PAINLEVEL_OUTOF10: 7

## 2024-07-15 ASSESSMENT — PAIN DESCRIPTION - LOCATION
LOCATION: HEAD;ABDOMEN
LOCATION: ABDOMEN;BACK
LOCATION: ABDOMEN;BACK

## 2024-07-15 ASSESSMENT — PAIN DESCRIPTION - DESCRIPTORS
DESCRIPTORS: ACHING

## 2024-07-15 ASSESSMENT — PAIN DESCRIPTION - ORIENTATION
ORIENTATION: LOWER
ORIENTATION: INNER

## 2024-07-15 NOTE — PROGRESS NOTES
[facility] Adult  Hospitalist Group                                                                                          Hospitalist Progress Note  Nora Reyes MD  Answering service: [phone] OR [phone] from in house phone        Date of Service:  7/15/2024  NAME:  Eugenie Zafar  :  1980  MRN:  [id number]       Admission Summary:   Eugenie Zafar is a 43 y.o. female with past medical history significant for dyslipidemia presented at the emergency room with abdominal pain and fever.  Patient symptoms started about 3 days ago.  The pain is located at the right lower quadrant, sharp, no radiation, 8/10 in severity, no known aggravating or relieving factors.  The fever is with rigors and chills.  The abdominal pain is associated with nausea but no vomiting, diarrhea and constipation.  Patient initially went to urgent care center where she was diagnosed with urinary tract infection and started on Bactrim.  Patient took 1 dose of the Bactrim and came to the emergency room because of worsening symptoms.  CT scan of the abdomen and pelvis done on arrival at the emergency room did not show acute pathology.  She presented with fever of 102.3, tachycardia and infected urine raising concern for sepsis.  Patient was started on antibiotics and referred to the hospitalist service for admission.       Interval history / Subjective:   Patient seen and examined.   She is much better today, pain is much improved.  Patient is eating and drinking, ambulating in the room.  Afebrile today.      Assessment & Plan:           Sepsis, POA;  Suspect Right pyelonephritis;  -continue IV Abx;   -continue IVFs;   -follow results of urine and blood cx;     Dyslipidemia:  -diet controlled;     Tobacco use disorder:  -patient counseled to quit smoking;   -Nicotine patch prn;    Hyponatremia:  -Na normalized overnight with IVFs;       Code status: full   Prophylaxis: Lovenox  Care Plan discussed with: patient,

## 2024-07-16 VITALS
WEIGHT: 122.5 LBS | BODY MASS INDEX: 23.92 KG/M2 | HEART RATE: 72 BPM | OXYGEN SATURATION: 97 % | TEMPERATURE: 98.9 F | DIASTOLIC BLOOD PRESSURE: 65 MMHG | RESPIRATION RATE: 18 BRPM | SYSTOLIC BLOOD PRESSURE: 111 MMHG

## 2024-07-16 PROBLEM — A41.9 SEPSIS (HCC): Status: RESOLVED | Noted: 2024-07-13 | Resolved: 2024-07-16

## 2024-07-16 LAB
COMMENT:: NORMAL
LACTATE SERPL-SCNC: 0.9 MMOL/L (ref 0.4–2)
SPECIMEN HOLD: NORMAL

## 2024-07-16 PROCEDURE — 6360000002 HC RX W HCPCS: Performed by: INTERNAL MEDICINE

## 2024-07-16 PROCEDURE — 80053 COMPREHEN METABOLIC PANEL: CPT

## 2024-07-16 PROCEDURE — 85025 COMPLETE CBC W/AUTO DIFF WBC: CPT

## 2024-07-16 PROCEDURE — 87040 BLOOD CULTURE FOR BACTERIA: CPT

## 2024-07-16 PROCEDURE — 6370000000 HC RX 637 (ALT 250 FOR IP): Performed by: INTERNAL MEDICINE

## 2024-07-16 PROCEDURE — 83605 ASSAY OF LACTIC ACID: CPT

## 2024-07-16 PROCEDURE — 83690 ASSAY OF LIPASE: CPT

## 2024-07-16 PROCEDURE — 36415 COLL VENOUS BLD VENIPUNCTURE: CPT

## 2024-07-16 PROCEDURE — 2580000003 HC RX 258: Performed by: INTERNAL MEDICINE

## 2024-07-16 PROCEDURE — 93005 ELECTROCARDIOGRAM TRACING: CPT | Performed by: STUDENT IN AN ORGANIZED HEALTH CARE EDUCATION/TRAINING PROGRAM

## 2024-07-16 PROCEDURE — 99285 EMERGENCY DEPT VISIT HI MDM: CPT

## 2024-07-16 RX ORDER — CEFDINIR 300 MG/1
300 CAPSULE ORAL 2 TIMES DAILY
Qty: 14 CAPSULE | Refills: 0 | Status: ON HOLD | OUTPATIENT
Start: 2024-07-16 | End: 2024-07-18 | Stop reason: HOSPADM

## 2024-07-16 RX ORDER — OXYCODONE HYDROCHLORIDE 5 MG/1
5 TABLET ORAL EVERY 4 HOURS PRN
Qty: 12 TABLET | Refills: 0 | Status: SHIPPED | OUTPATIENT
Start: 2024-07-16 | End: 2024-07-19

## 2024-07-16 RX ORDER — ENOXAPARIN SODIUM 100 MG/ML
40 INJECTION SUBCUTANEOUS DAILY
Status: DISCONTINUED | OUTPATIENT
Start: 2024-07-17 | End: 2024-07-16 | Stop reason: HOSPADM

## 2024-07-16 RX ADMIN — SODIUM CHLORIDE, PRESERVATIVE FREE 40 MG: 5 INJECTION INTRAVENOUS at 08:14

## 2024-07-16 RX ADMIN — ACETAMINOPHEN 650 MG: 325 TABLET ORAL at 08:14

## 2024-07-16 RX ADMIN — ENOXAPARIN SODIUM 30 MG: 100 INJECTION SUBCUTANEOUS at 08:14

## 2024-07-16 RX ADMIN — ONDANSETRON 4 MG: 2 INJECTION INTRAMUSCULAR; INTRAVENOUS at 00:49

## 2024-07-16 RX ADMIN — OXYCODONE 5 MG: 5 TABLET ORAL at 00:47

## 2024-07-16 RX ADMIN — SODIUM CHLORIDE, PRESERVATIVE FREE 10 ML: 5 INJECTION INTRAVENOUS at 08:15

## 2024-07-16 ASSESSMENT — PAIN DESCRIPTION - FREQUENCY: FREQUENCY: CONTINUOUS

## 2024-07-16 ASSESSMENT — PAIN - FUNCTIONAL ASSESSMENT
PAIN_FUNCTIONAL_ASSESSMENT: 0-10
PAIN_FUNCTIONAL_ASSESSMENT: PREVENTS OR INTERFERES SOME ACTIVE ACTIVITIES AND ADLS

## 2024-07-16 ASSESSMENT — PAIN SCALES - GENERAL
PAINLEVEL_OUTOF10: 9
PAINLEVEL_OUTOF10: 5
PAINLEVEL_OUTOF10: 3

## 2024-07-16 ASSESSMENT — PAIN DESCRIPTION - ONSET: ONSET: ON-GOING

## 2024-07-16 ASSESSMENT — PAIN DESCRIPTION - LOCATION
LOCATION: BACK
LOCATION: ABDOMEN;FLANK
LOCATION: ABDOMEN

## 2024-07-16 ASSESSMENT — PAIN DESCRIPTION - DESCRIPTORS
DESCRIPTORS: TIGHTNESS;ACHING
DESCRIPTORS: ACHING
DESCRIPTORS: ACHING;DULL

## 2024-07-16 ASSESSMENT — PAIN DESCRIPTION - ORIENTATION
ORIENTATION: UPPER;LOWER;RIGHT;LEFT
ORIENTATION: MID

## 2024-07-16 ASSESSMENT — PAIN DESCRIPTION - PAIN TYPE: TYPE: ACUTE PAIN

## 2024-07-16 NOTE — PROGRESS NOTES
igned         Bedside shift change report given to DIOGENES Ellis (oncoming nurse) by DIOGENES De La Cruz (offgoing nurse). Report included the following information Nurse Handoff Report, Index, ED Encounter Summary, ED SBAR, Adult Overview, Surgery Report, Intake/Output, MAR, Recent Results, Med Rec Status, Alarm Parameters, Quality Measures, and Neuro Assessment.

## 2024-07-16 NOTE — DISCHARGE SUMMARY
Discharge Summary       PATIENT ID: Eugenie Zafar  MRN: [id number]   YOB: 1980    DATE OF ADMISSION: 7/13/2024  6:25 PM    DATE OF DISCHARGE: 7/16/2024   PRIMARY CARE PROVIDER: [facility], Pcp     ATTENDING PHYSICIAN: Kwame Birmingham MD   DISCHARGING PROVIDER: RUDDY Morales CNP    To contact this individual call [phone] and ask the  to page.  If unavailable ask to be transferred the Adult Hospitalist Department.    CONSULTATIONS: IP CONSULT TO HOSPITALIST    PROCEDURES/SURGERIES: * No surgery found *    ADMITTING DIAGNOSES & HOSPITAL COURSE:   Eugenie Zafar is a 43 y.o. female with past medical history significant for dyslipidemia presented at the emergency room with abdominal pain and fever.  Patient symptoms started about 3 days ago.  The pain is located at the right lower quadrant, sharp, no radiation, 8/10 in severity, no known aggravating or relieving factors.  The fever is with rigors and chills.  The abdominal pain is associated with nausea but no vomiting, diarrhea and constipation.  Patient initially went to urgent care center where she was diagnosed with urinary tract infection and started on Bactrim.  Patient took 1 dose of the Bactrim and came to the emergency room because of worsening symptoms.  CT scan of the abdomen and pelvis done on arrival at the emergency room did not show acute pathology.  She presented with fever of 102.3, tachycardia and infected urine raising concern for sepsis.  Patient was started on antibiotics and referred to the hospitalist service for admission.    Labs and vital signs are stable.  Blood and urine cultures were negative.  Patient has been afebrile for > 24 hours and is feeling much better than on admission.  Will complete course of oral antibiotics at discharge.  Patient has been deemed medically stable for  color, no rash  Psych: normal affect, normal judgment/insight, normal mood    CHRONIC MEDICAL DIAGNOSES:      Greater than 31 minutes were spent with the patient on counseling and coordination of care    Signed:   RUDDY Morales CNP  7/16/2024  11:00 AM

## 2024-07-16 NOTE — PROGRESS NOTES
Lovenox Monitoring  Indication: DVT Prophylaxis  Recent Labs     07/13/24  1759 07/14/24  0737   HGB 13.7 11.7    119*     Current Weight: 55.6 kg  Est. CrCl = 90 ml/min  Current Dose: 30 mg subcutaneously every 24 hours.  Plan: Change to 40 mg every 24 hours for weight >51 kg.     Britney Trotter, TAMIKA, PharmD

## 2024-07-17 ENCOUNTER — APPOINTMENT (OUTPATIENT)
Facility: HOSPITAL | Age: 44
End: 2024-07-17
Payer: COMMERCIAL

## 2024-07-17 ENCOUNTER — HOSPITAL ENCOUNTER (OUTPATIENT)
Facility: HOSPITAL | Age: 44
Setting detail: OBSERVATION
LOS: 1 days | Discharge: HOME | End: 2024-07-18
Attending: STUDENT IN AN ORGANIZED HEALTH CARE EDUCATION/TRAINING PROGRAM | Admitting: FAMILY MEDICINE
Payer: COMMERCIAL

## 2024-07-17 DIAGNOSIS — N10 ACUTE PYELONEPHRITIS: Primary | ICD-10-CM

## 2024-07-17 DIAGNOSIS — A41.9 SEPTICEMIA (HCC): ICD-10-CM

## 2024-07-17 PROBLEM — N12 PYELONEPHRITIS: Status: ACTIVE | Noted: 2024-07-17

## 2024-07-17 LAB
ALBUMIN SERPL-MCNC: 3.2 G/DL (ref 3.5–5)
ALBUMIN/GLOB SERPL: 0.8 (ref 1.1–2.2)
ALP SERPL-CCNC: 107 U/L (ref 45–117)
ALT SERPL-CCNC: 22 U/L (ref 12–78)
ANION GAP SERPL CALC-SCNC: 5 MMOL/L (ref 5–15)
APPEARANCE UR: CLEAR
AST SERPL-CCNC: 23 U/L (ref 15–37)
BACTERIA URNS QL MICRO: NEGATIVE /HPF
BASOPHILS # BLD: 0 K/UL (ref 0–0.1)
BASOPHILS NFR BLD: 0 % (ref 0–1)
BILIRUB SERPL-MCNC: 0.3 MG/DL (ref 0.2–1)
BILIRUB UR QL: NEGATIVE
BUN SERPL-MCNC: 5 MG/DL (ref 6–20)
BUN/CREAT SERPL: 8 (ref 12–20)
CALCIUM SERPL-MCNC: 9.2 MG/DL (ref 8.5–10.1)
CHLORIDE SERPL-SCNC: 105 MMOL/L (ref 97–108)
CO2 SERPL-SCNC: 26 MMOL/L (ref 21–32)
COLOR UR: ABNORMAL
COMMENT:: NORMAL
CREAT SERPL-MCNC: 0.61 MG/DL (ref 0.55–1.02)
DIFFERENTIAL METHOD BLD: ABNORMAL
EKG ATRIAL RATE: 105 BPM
EKG DIAGNOSIS: NORMAL
EKG P AXIS: 80 DEGREES
EKG P-R INTERVAL: 136 MS
EKG Q-T INTERVAL: 312 MS
EKG QRS DURATION: 86 MS
EKG QTC CALCULATION (BAZETT): 412 MS
EKG R AXIS: 67 DEGREES
EKG T AXIS: 67 DEGREES
EKG VENTRICULAR RATE: 105 BPM
EOSINOPHIL # BLD: 0.1 K/UL (ref 0–0.4)
EOSINOPHIL NFR BLD: 1 % (ref 0–7)
EPITH CASTS URNS QL MICRO: ABNORMAL /LPF
ERYTHROCYTE [DISTWIDTH] IN BLOOD BY AUTOMATED COUNT: 14.1 % (ref 11.5–14.5)
GLOBULIN SER CALC-MCNC: 3.8 G/DL (ref 2–4)
GLUCOSE SERPL-MCNC: 104 MG/DL (ref 65–100)
GLUCOSE UR STRIP.AUTO-MCNC: NEGATIVE MG/DL
HCG UR QL: NEGATIVE
HCT VFR BLD AUTO: 34.8 % (ref 35–47)
HGB BLD-MCNC: 12.4 G/DL (ref 11.5–16)
HGB UR QL STRIP: ABNORMAL
HYALINE CASTS URNS QL MICRO: ABNORMAL /LPF (ref 0–5)
IMM GRANULOCYTES # BLD AUTO: 0 K/UL
IMM GRANULOCYTES NFR BLD AUTO: 0 %
KETONES UR QL STRIP.AUTO: 15 MG/DL
LEUKOCYTE ESTERASE UR QL STRIP.AUTO: ABNORMAL
LIPASE SERPL-CCNC: 31 U/L (ref 13–75)
LYMPHOCYTES # BLD: 1.9 K/UL (ref 0.8–3.5)
LYMPHOCYTES NFR BLD: 36 % (ref 12–49)
MCH RBC QN AUTO: 30 PG (ref 26–34)
MCHC RBC AUTO-ENTMCNC: 35.6 G/DL (ref 30–36.5)
MCV RBC AUTO: 84.3 FL (ref 80–99)
MONOCYTES # BLD: 0.5 K/UL (ref 0–1)
MONOCYTES NFR BLD: 9 % (ref 5–13)
NEUTS SEG # BLD: 2.8 K/UL (ref 1.8–8)
NEUTS SEG NFR BLD: 54 % (ref 32–75)
NITRITE UR QL STRIP.AUTO: NEGATIVE
NRBC # BLD: 0 K/UL (ref 0–0.01)
NRBC BLD-RTO: 0 PER 100 WBC
PH UR STRIP: 7.5 (ref 5–8)
PLATELET # BLD AUTO: 146 K/UL (ref 150–400)
PMV BLD AUTO: 10 FL (ref 8.9–12.9)
POTASSIUM SERPL-SCNC: 3.5 MMOL/L (ref 3.5–5.1)
PROT SERPL-MCNC: 7 G/DL (ref 6.4–8.2)
PROT UR STRIP-MCNC: NEGATIVE MG/DL
RBC # BLD AUTO: 4.13 M/UL (ref 3.8–5.2)
RBC #/AREA URNS HPF: >100 /HPF (ref 0–5)
RBC MORPH BLD: ABNORMAL
SODIUM SERPL-SCNC: 136 MMOL/L (ref 136–145)
SP GR UR REFRACTOMETRY: 1.01 (ref 1–1.03)
SPECIMEN HOLD: NORMAL
SPECIMEN HOLD: NORMAL
UROBILINOGEN UR QL STRIP.AUTO: 0.2 EU/DL (ref 0.2–1)
WBC # BLD AUTO: 5.3 K/UL (ref 3.6–11)
WBC URNS QL MICRO: ABNORMAL /HPF (ref 0–4)

## 2024-07-17 PROCEDURE — 71275 CT ANGIOGRAPHY CHEST: CPT

## 2024-07-17 PROCEDURE — 1100000000 HC RM PRIVATE

## 2024-07-17 PROCEDURE — 2580000003 HC RX 258: Performed by: STUDENT IN AN ORGANIZED HEALTH CARE EDUCATION/TRAINING PROGRAM

## 2024-07-17 PROCEDURE — 81001 URINALYSIS AUTO W/SCOPE: CPT

## 2024-07-17 PROCEDURE — 6360000004 HC RX CONTRAST MEDICATION: Performed by: RADIOLOGY

## 2024-07-17 PROCEDURE — 6360000002 HC RX W HCPCS: Performed by: NURSE PRACTITIONER

## 2024-07-17 PROCEDURE — 2580000003 HC RX 258: Performed by: NURSE PRACTITIONER

## 2024-07-17 PROCEDURE — 74178 CT ABD&PLV WO CNTR FLWD CNTR: CPT

## 2024-07-17 PROCEDURE — 96374 THER/PROPH/DIAG INJ IV PUSH: CPT

## 2024-07-17 PROCEDURE — 96375 TX/PRO/DX INJ NEW DRUG ADDON: CPT

## 2024-07-17 PROCEDURE — 81025 URINE PREGNANCY TEST: CPT

## 2024-07-17 PROCEDURE — 6360000002 HC RX W HCPCS: Performed by: STUDENT IN AN ORGANIZED HEALTH CARE EDUCATION/TRAINING PROGRAM

## 2024-07-17 PROCEDURE — 6370000000 HC RX 637 (ALT 250 FOR IP): Performed by: NURSE PRACTITIONER

## 2024-07-17 PROCEDURE — 87086 URINE CULTURE/COLONY COUNT: CPT

## 2024-07-17 RX ORDER — SODIUM CHLORIDE 9 MG/ML
INJECTION, SOLUTION INTRAVENOUS PRN
Status: DISCONTINUED | OUTPATIENT
Start: 2024-07-17 | End: 2024-07-18 | Stop reason: HOSPADM

## 2024-07-17 RX ORDER — ONDANSETRON 4 MG/1
4 TABLET, ORALLY DISINTEGRATING ORAL EVERY 8 HOURS PRN
Status: DISCONTINUED | OUTPATIENT
Start: 2024-07-17 | End: 2024-07-18 | Stop reason: HOSPADM

## 2024-07-17 RX ORDER — ONDANSETRON 2 MG/ML
4 INJECTION INTRAMUSCULAR; INTRAVENOUS EVERY 6 HOURS PRN
Status: DISCONTINUED | OUTPATIENT
Start: 2024-07-17 | End: 2024-07-18 | Stop reason: HOSPADM

## 2024-07-17 RX ORDER — SODIUM CHLORIDE 9 MG/ML
INJECTION, SOLUTION INTRAVENOUS CONTINUOUS
Status: DISCONTINUED | OUTPATIENT
Start: 2024-07-17 | End: 2024-07-18 | Stop reason: HOSPADM

## 2024-07-17 RX ORDER — ONDANSETRON 2 MG/ML
4 INJECTION INTRAMUSCULAR; INTRAVENOUS ONCE
Status: COMPLETED | OUTPATIENT
Start: 2024-07-17 | End: 2024-07-17

## 2024-07-17 RX ORDER — ACETAMINOPHEN 650 MG/1
650 SUPPOSITORY RECTAL EVERY 6 HOURS PRN
Status: DISCONTINUED | OUTPATIENT
Start: 2024-07-17 | End: 2024-07-18 | Stop reason: HOSPADM

## 2024-07-17 RX ORDER — ACETAMINOPHEN 325 MG/1
650 TABLET ORAL EVERY 6 HOURS PRN
COMMUNITY

## 2024-07-17 RX ORDER — ENOXAPARIN SODIUM 100 MG/ML
40 INJECTION SUBCUTANEOUS DAILY
Status: DISCONTINUED | OUTPATIENT
Start: 2024-07-17 | End: 2024-07-18 | Stop reason: HOSPADM

## 2024-07-17 RX ORDER — OXYCODONE HYDROCHLORIDE 5 MG/1
5 TABLET ORAL EVERY 4 HOURS PRN
Status: DISCONTINUED | OUTPATIENT
Start: 2024-07-17 | End: 2024-07-18 | Stop reason: HOSPADM

## 2024-07-17 RX ORDER — SODIUM CHLORIDE 0.9 % (FLUSH) 0.9 %
5-40 SYRINGE (ML) INJECTION EVERY 12 HOURS SCHEDULED
Status: DISCONTINUED | OUTPATIENT
Start: 2024-07-17 | End: 2024-07-18 | Stop reason: HOSPADM

## 2024-07-17 RX ORDER — 0.9 % SODIUM CHLORIDE 0.9 %
1000 INTRAVENOUS SOLUTION INTRAVENOUS ONCE
Status: COMPLETED | OUTPATIENT
Start: 2024-07-17 | End: 2024-07-17

## 2024-07-17 RX ORDER — POLYETHYLENE GLYCOL 3350 17 G/17G
17 POWDER, FOR SOLUTION ORAL DAILY PRN
Status: DISCONTINUED | OUTPATIENT
Start: 2024-07-17 | End: 2024-07-18 | Stop reason: HOSPADM

## 2024-07-17 RX ORDER — SODIUM CHLORIDE 0.9 % (FLUSH) 0.9 %
5-40 SYRINGE (ML) INJECTION PRN
Status: DISCONTINUED | OUTPATIENT
Start: 2024-07-17 | End: 2024-07-18 | Stop reason: HOSPADM

## 2024-07-17 RX ORDER — ACETAMINOPHEN 325 MG/1
650 TABLET ORAL EVERY 6 HOURS PRN
Status: DISCONTINUED | OUTPATIENT
Start: 2024-07-17 | End: 2024-07-18 | Stop reason: HOSPADM

## 2024-07-17 RX ORDER — MORPHINE SULFATE 4 MG/ML
4 INJECTION, SOLUTION INTRAMUSCULAR; INTRAVENOUS
Status: COMPLETED | OUTPATIENT
Start: 2024-07-17 | End: 2024-07-17

## 2024-07-17 RX ADMIN — MORPHINE SULFATE 4 MG: 4 INJECTION, SOLUTION INTRAMUSCULAR; INTRAVENOUS at 05:07

## 2024-07-17 RX ADMIN — IOPAMIDOL 100 ML: 755 INJECTION, SOLUTION INTRAVENOUS at 12:42

## 2024-07-17 RX ADMIN — ONDANSETRON 4 MG: 2 INJECTION INTRAMUSCULAR; INTRAVENOUS at 22:40

## 2024-07-17 RX ADMIN — SODIUM CHLORIDE: 9 INJECTION, SOLUTION INTRAVENOUS at 12:22

## 2024-07-17 RX ADMIN — SODIUM CHLORIDE 1000 ML: 9 INJECTION, SOLUTION INTRAVENOUS at 03:50

## 2024-07-17 RX ADMIN — ONDANSETRON 4 MG: 2 INJECTION INTRAMUSCULAR; INTRAVENOUS at 05:07

## 2024-07-17 RX ADMIN — Medication 10 ML: at 12:24

## 2024-07-17 RX ADMIN — WATER 1000 MG: 1 INJECTION INTRAMUSCULAR; INTRAVENOUS; SUBCUTANEOUS at 05:10

## 2024-07-17 RX ADMIN — SODIUM CHLORIDE: 9 INJECTION, SOLUTION INTRAVENOUS at 22:43

## 2024-07-17 RX ADMIN — ACETAMINOPHEN 650 MG: 325 TABLET ORAL at 16:46

## 2024-07-17 RX ADMIN — ENOXAPARIN SODIUM 40 MG: 100 INJECTION SUBCUTANEOUS at 12:23

## 2024-07-17 ASSESSMENT — PAIN DESCRIPTION - PAIN TYPE: TYPE: ACUTE PAIN

## 2024-07-17 ASSESSMENT — PAIN DESCRIPTION - ONSET: ONSET: ON-GOING

## 2024-07-17 ASSESSMENT — PAIN DESCRIPTION - ORIENTATION: ORIENTATION: LOWER

## 2024-07-17 ASSESSMENT — PAIN SCALES - GENERAL
PAINLEVEL_OUTOF10: 5
PAINLEVEL_OUTOF10: 9
PAINLEVEL_OUTOF10: 9
PAINLEVEL_OUTOF10: 6
PAINLEVEL_OUTOF10: 0

## 2024-07-17 ASSESSMENT — PAIN DESCRIPTION - LOCATION
LOCATION: HEAD
LOCATION: ABDOMEN
LOCATION: ABDOMEN;BACK

## 2024-07-17 ASSESSMENT — PAIN DESCRIPTION - DESCRIPTORS
DESCRIPTORS: ACHING
DESCRIPTORS: PRESSURE

## 2024-07-17 ASSESSMENT — PAIN DESCRIPTION - DIRECTION: RADIATING_TOWARDS: BACK

## 2024-07-17 ASSESSMENT — PAIN - FUNCTIONAL ASSESSMENT
PAIN_FUNCTIONAL_ASSESSMENT: PREVENTS OR INTERFERES SOME ACTIVE ACTIVITIES AND ADLS
PAIN_FUNCTIONAL_ASSESSMENT: ACTIVITIES ARE NOT PREVENTED

## 2024-07-17 ASSESSMENT — PAIN DESCRIPTION - FREQUENCY: FREQUENCY: CONTINUOUS

## 2024-07-17 NOTE — ED TRIAGE NOTES
Patient arrives POV to ED cc abdominal pain since Thursday. Patient was here Sat-Mon, went home with abx and pain meds. Patient felt better and then woke up today with worsening pain. Patient has a fever and is tachycardic, concerned about worsening kidney infection.    Took Tylenol at 2000, zofran and oxycodone at 2200.

## 2024-07-17 NOTE — H&P
History and Physical    Date of Service:  7/17/2024  Primary Care Provider: [facility], Pcp  Source of information: The patient and Chart review    Chief Complaint: Abdominal Pain    History of Presenting Illness:   Eugenie Zafar is a 43 y.o. female with a history of abnormal pap smears (s/p uterine ablation) and recent admission for pyelonephritis (7/13-7/16) who presented to the ED for evaluation of abdominal pain and fever.  Patient reported after discharge yesterday, she went home, took a shower and then took a nap but when she woke up, she started having significant abdominal discomfort/distention, fever and heart racing that she had had prior to her last admission.  She reports that she took Tylenol and an oxycodone and when she did not feel better, returned to the ED. She reported severe pain described as a pressure in her lower abdomen and pelvis wrapping around to both sides of her lower back with intermittent sharp pains.  She has trouble catching her breath or taking a deep breath due to the discomfort that is in the lower part of her chest/abdomen.     Patient was seen and examined this morning, she was lying on the ED stretcher in no acute distress, cooperative and interactive with assessment.  She became tearful during assessment due to her frustration about her continued medical illness.  At time of assessment, she denied any headaches or dizziness.  She reported some lower chest discomfort, especially when taking a deep breath in and had a hard time catching her breath due to the pain.  She also reported abdominal discomfort which wrapped around her stomach and into her back area and down into her bilateral groins.  Denies any nausea or vomiting, diarrhea.  She has not had no bowel movements in the past several days.  Denies any dysuria.  Reports that she took Tylenol, oxycodone and 1 dose of antibiotic after discharge yesterday.    Medication reconciliation completed with patient at  bedside.    1545 Addendum:  Contacted by nursing - pt wants to leave hospital. Pt was seen/visited, feels better right now - voiced frustration about hospitalization and still being in ED. We discussed concern about her leaving the hospital now - advise against this and would recommend continuing with fluids, IV abx and monitoring of blood cultures/urine cultures through at least tomorrow and then can re-eval discharge then. Pt was just discharged yesterday and returned <24 hours due to pain, distention and fever. Pt willing to consider staying, checking on bed assignment.       REVIEW OF SYSTEMS:  As mentioned above in the HPI    Past Medical History:   Diagnosis Date    Abnormal Pap smear     HPV last pap. Will follow up after pregnancy FOLLOW NEGATIVE     Postpartum depression     Mild 3-4 months in duration      Past Surgical History:   Procedure Laterality Date    GYN      2 children    HYSTEROSCOPY N/A 2/22/2024    VULVAR BIOPSIES, LASER ABLATION OF VULVA, HYSTEROSCOPY, DILATION AND CURETTAGE performed by Santosh Almeida MD at [facility] OR    LEEP  05/15/2019    HGSIL, GISSELL 2, GISSELL 3    OTHER SURGICAL HISTORY      ovarian cyst removed 1995    VULVA SURGERY N/A 2/22/2024    . performed by Santosh Almeida MD at [facility] OR    WISDOM TOOTH EXTRACTION       Prior to Admission medications    Medication Sig Start Date End Date Taking? Authorizing Provider   acetaminophen (TYLENOL) 325 MG tablet Take 2 tablets by mouth every 6 hours as needed for Pain   Yes Provider, MD Glenis   oxyCODONE (ROXICODONE) 5 MG immediate release tablet Take 1 tablet by mouth every 4 hours as needed for Pain for up to 3 days. Max Daily Amount: 30 mg 7/16/24 7/19/24 Yes Jelena Gonzalez APRN - CNP   cefdinir (OMNICEF) 300 MG capsule Take 1 capsule by mouth 2 times daily for 7 days 7/16/24 7/23/24 Yes Jelena Gonzalez APRN - CNP   ibuprofen (ADVIL;MOTRIN) 200 MG tablet Take by mouth as needed   Yes Automatic

## 2024-07-17 NOTE — ED PROVIDER NOTES
[facility] EMERGENCY DEP  EMERGENCY DEPARTMENT ENCOUNTER      Pt Name: Eugenie Zafar  MRN: [id number]  Birthdate 1980  Date of evaluation: 7/16/2024  Provider: Liana Tyson MD    CHIEF COMPLAINT       Chief Complaint   Patient presents with    Abdominal Pain         HISTORY OF PRESENT ILLNESS    Review of Medical Records:     Nursing Triage Notes were reviewed.    HPI    Eugenie Zafar is a 43 y.o. female with a history of abnormal Pap smears status post uterine ablation and recent admission for pyelonephritis who presents to the emergency department for evaluation of abdominal pain and fever.  Patient reports that she was just discharged after admission 7/13 through 7/15 for UTI.  States that symptoms have overall appeared to improve during her treatment in the hospital, however patient reports she woke up today with worsening pain.  Complains that she now has a fever again 101.3 °F when fever had resolved prior to discharge.  Complains of severe pain that she describes as a pressure in her lower abdomen and pelvis wrapping around to both sides of her lower back with intermittent sharp pains.  Reports that she took Tylenol, oxycodone, and Zofran without relief.  Expresses concern about possible worsening kidney infection.  Reports she has taken 1 dose of her prescribed cefdinir support since leaving the hospital.  Denies any history of abdominal surgeries in the past aside from a uterine biopsy and ablation.        PAST MEDICAL HISTORY     Past Medical History:   Diagnosis Date    Abnormal Pap smear     HPV last pap. Will follow up after pregnancy FOLLOW NEGATIVE     Postpartum depression     Mild 3-4 months in duration         SURGICAL HISTORY       Past Surgical History:   Procedure Laterality Date    GYN      2 children    HYSTEROSCOPY N/A 2/22/2024    VULVAR BIOPSIES, LASER ABLATION OF VULVA, HYSTEROSCOPY, DILATION AND CURETTAGE performed by Santosh Almeida MD at [facility]    KATHARINA

## 2024-07-17 NOTE — ED NOTES
ED TO INPATIENT SBAR HANDOFF    Patient Name: Eugenie Zafar   :  1980  43 y.o.   MRN:  [id number]  ED Room #:  ER10/10  Family/Caregiver Present no       Situation  Code Status: Full Code     Allergies: Patient has no known allergies.  Weight: Patient Vitals for the past 96 hrs (Last 3 readings):   Weight   24 2236 55.4 kg (122 lb 2.2 oz)     Arrived from: home  Chief Complaint:   Chief Complaint   Patient presents with    Abdominal Pain     Hospital Problem/Diagnosis:  Principal Problem:    Pyelonephritis  Resolved Problems:    * No resolved hospital problems. *    Imaging:   CTA CHEST W WO CONTRAST   Final Result   1.  Small volume free fluid and inflammation extending throughout the pelvis and   lower abdomen, may be postprocedural following hysteroscopy/D&C.   2.  Appearance of kidneys may represent pyelonephritis   3.  No PE. Clear lungs.      Electronically signed by JIM MCCLOUD      CT ABDOMEN PELVIS W WO CONTRAST Additional Contrast? Radiologist Recommendation   Final Result   1.  Small volume free fluid and inflammation extending throughout the pelvis and   lower abdomen, may be postprocedural following hysteroscopy/D&C.   2.  Appearance of kidneys may represent pyelonephritis   3.  No PE. Clear lungs.      Electronically signed by [employer]        Abnormal labs:   Abnormal Labs Reviewed   CBC WITH AUTO DIFFERENTIAL - Abnormal; Notable for the following components:       Result Value    Hematocrit 34.8 (*)     Platelets 146 (*)     All other components within normal limits   COMPREHENSIVE METABOLIC PANEL - Abnormal; Notable for the following components:    Glucose 104 (*)     BUN 5 (*)     BUN/Creatinine Ratio 8 (*)     Albumin 3.2 (*)     Albumin/Globulin Ratio 0.8 (*)     All other components within normal limits   URINALYSIS WITH MICROSCOPIC - Abnormal; Notable for the following components:    Ketones, Urine 15 (*)     Blood, Urine LARGE (*)     Leukocyte Esterase, Urine TRACE (*)      Pressure  Functional Pain Assessment: Prevents or interferes some active activities and ADLs  Pain Type: Acute pain  Pain Radiating Towards: back  Pain Frequency: Continuous  Pain Onset: On-going  Non-Pharmaceutical Pain Intervention(s): Rest, Repositioned  Last documented pain score (0-10 scale) Pain Level: 5  Last documented pain medication administered: morphine      Mental Status: oriented and alert  Orientation Level: Orientation Level: Oriented X4  NIH Score:    C-SSRS: Risk of Suicide: No Risk  Bedside swallow:    Doug Coma Scale (GCS): Doug Coma Scale  Eye Opening: Spontaneous  Best Verbal Response: Oriented  Best Motor Response: Obeys commands  Doug Coma Scale Score: 15  Active LDA's:   Peripheral IV 07/16/24 Right Antecubital (Active)   Site Assessment Clean, dry & intact 07/16/24 2304   Line Status Blood return noted;Flushed;Specimen collected 07/16/24 2304   Line Care Connections checked and tightened 07/16/24 2304   Phlebitis Assessment No symptoms 07/16/24 2304   Infiltration Assessment 0 07/16/24 2304   Dressing Status New dressing applied;Clean, dry & intact 07/16/24 2304   Dressing Type Transparent 07/16/24 2304   Dressing Intervention New 07/16/24 2304     PO Status: Regular  Pertinent or High Risk Medications/Drips: no   If Yes, please provide details:   Titratable drips: no   Pending Blood Product Administration: no     Sepsis    Sepsis Risk Score   Predictive Model Details          6 (Low)  Factor Value    Calculated 7/17/2024 15:36 10% O2 Delivery Method ROOM AIR    [facility] EARLY DETECTION OF SEPSIS VERSION 2 Model 8% Urinary Tract Infection 1     -7% Duration of Encounter .6 days     6% Respirations 21     5% Albumin 3.2 g/dL     -5% Age 43 years old     -4% AST 23 U/L     -3% Chloride 105 mmol/L     -2% BMI 23.9     2% Change in RBC Count 3.93 M/uL -> 4.13 M/uL      Recent Labs     07/16/24 2245   WBC 5.3     Blood Cultures Drawn: Yes 7/16 2245  Repeat LA: Time Due N/A  Antibiotic

## 2024-07-17 NOTE — ED NOTES
At bedside to assess pain. Pt resting in bed with eyes closed. Even rise and fall of chest; no distress noted. vss

## 2024-07-18 VITALS
WEIGHT: 122.14 LBS | HEIGHT: 60 IN | TEMPERATURE: 98.8 F | DIASTOLIC BLOOD PRESSURE: 77 MMHG | HEART RATE: 91 BPM | OXYGEN SATURATION: 98 % | SYSTOLIC BLOOD PRESSURE: 116 MMHG | BODY MASS INDEX: 23.98 KG/M2 | RESPIRATION RATE: 20 BRPM

## 2024-07-18 PROBLEM — N39.0 UTI (URINARY TRACT INFECTION): Status: ACTIVE | Noted: 2024-07-18

## 2024-07-18 LAB
A VAGINAE DNA VAG QL NAA+PROBE: ABNORMAL SCORE
ANION GAP SERPL CALC-SCNC: 5 MMOL/L (ref 5–15)
BACTERIA SPEC CULT: NORMAL
BASOPHILS # BLD: 0 K/UL (ref 0–0.1)
BASOPHILS NFR BLD: 0 % (ref 0–1)
BUN SERPL-MCNC: 8 MG/DL (ref 6–20)
BUN/CREAT SERPL: 17 (ref 12–20)
BVAB2 DNA VAG QL NAA+PROBE: ABNORMAL SCORE
C ALBICANS DNA VAG QL NAA+PROBE: NEGATIVE
C GLABRATA DNA VAG QL NAA+PROBE: NEGATIVE
C KRUSEI DNA VAG QL NAA+PROBE: NEGATIVE
C LUSITANIAE DNA VAG QL NAA+PROBE: NEGATIVE
C TRACH DNA SPEC QL NAA+PROBE: NEGATIVE
CALCIUM SERPL-MCNC: 8.3 MG/DL (ref 8.5–10.1)
CANDIDA DNA VAG QL NAA+PROBE: NEGATIVE
CHLORIDE SERPL-SCNC: 109 MMOL/L (ref 97–108)
CO2 SERPL-SCNC: 26 MMOL/L (ref 21–32)
CREAT SERPL-MCNC: 0.47 MG/DL (ref 0.55–1.02)
DIFFERENTIAL METHOD BLD: ABNORMAL
EOSINOPHIL # BLD: 0 K/UL (ref 0–0.4)
EOSINOPHIL NFR BLD: 0 % (ref 0–7)
ERYTHROCYTE [DISTWIDTH] IN BLOOD BY AUTOMATED COUNT: 14.2 % (ref 11.5–14.5)
GLUCOSE SERPL-MCNC: 104 MG/DL (ref 65–100)
HCT VFR BLD AUTO: 30.4 % (ref 35–47)
HGB BLD-MCNC: 10.4 G/DL (ref 11.5–16)
IMM GRANULOCYTES # BLD AUTO: 0 K/UL
IMM GRANULOCYTES NFR BLD AUTO: 0 %
LYMPHOCYTES # BLD: 1.9 K/UL (ref 0.8–3.5)
LYMPHOCYTES NFR BLD: 34 % (ref 12–49)
MCH RBC QN AUTO: 29.3 PG (ref 26–34)
MCHC RBC AUTO-ENTMCNC: 34.2 G/DL (ref 30–36.5)
MCV RBC AUTO: 85.6 FL (ref 80–99)
MEGA1 DNA VAG QL NAA+PROBE: ABNORMAL SCORE
METAMYELOCYTES NFR BLD MANUAL: 1 %
MONOCYTES # BLD: 0.2 K/UL (ref 0–1)
MONOCYTES NFR BLD: 4 % (ref 5–13)
N GONORRHOEA DNA VAG QL NAA+PROBE: NEGATIVE
NEUTS SEG # BLD: 3.4 K/UL (ref 1.8–8)
NEUTS SEG NFR BLD: 61 % (ref 32–75)
NRBC # BLD: 0 K/UL (ref 0–0.01)
NRBC BLD-RTO: 0 PER 100 WBC
PLATELET # BLD AUTO: 145 K/UL (ref 150–400)
PMV BLD AUTO: 10.1 FL (ref 8.9–12.9)
POTASSIUM SERPL-SCNC: 4.5 MMOL/L (ref 3.5–5.1)
RBC # BLD AUTO: 3.55 M/UL (ref 3.8–5.2)
RBC MORPH BLD: ABNORMAL
SERVICE CMNT-IMP: NORMAL
SODIUM SERPL-SCNC: 140 MMOL/L (ref 136–145)
T VAGINALIS DNA VAG QL NAA+PROBE: NEGATIVE
WBC # BLD AUTO: 5.5 K/UL (ref 3.6–11)
WBC MORPH BLD: ABNORMAL

## 2024-07-18 PROCEDURE — 6370000000 HC RX 637 (ALT 250 FOR IP): Performed by: NURSE PRACTITIONER

## 2024-07-18 PROCEDURE — 85025 COMPLETE CBC W/AUTO DIFF WBC: CPT

## 2024-07-18 PROCEDURE — 80048 BASIC METABOLIC PNL TOTAL CA: CPT

## 2024-07-18 PROCEDURE — 6360000002 HC RX W HCPCS: Performed by: NURSE PRACTITIONER

## 2024-07-18 PROCEDURE — 2580000003 HC RX 258: Performed by: NURSE PRACTITIONER

## 2024-07-18 PROCEDURE — 36415 COLL VENOUS BLD VENIPUNCTURE: CPT

## 2024-07-18 RX ORDER — CEFPODOXIME PROXETIL 200 MG/1
200 TABLET, FILM COATED ORAL 2 TIMES DAILY
Qty: 10 TABLET | Refills: 0 | Status: SHIPPED | OUTPATIENT
Start: 2024-07-18 | End: 2024-07-23

## 2024-07-18 RX ADMIN — Medication 10 ML: at 10:58

## 2024-07-18 RX ADMIN — OXYCODONE 5 MG: 5 TABLET ORAL at 04:23

## 2024-07-18 RX ADMIN — ENOXAPARIN SODIUM 40 MG: 100 INJECTION SUBCUTANEOUS at 10:57

## 2024-07-18 RX ADMIN — WATER 1000 MG: 1 INJECTION INTRAMUSCULAR; INTRAVENOUS; SUBCUTANEOUS at 04:18

## 2024-07-18 ASSESSMENT — PAIN SCALES - GENERAL
PAINLEVEL_OUTOF10: 6
PAINLEVEL_OUTOF10: 0

## 2024-07-18 ASSESSMENT — PAIN DESCRIPTION - ORIENTATION: ORIENTATION: RIGHT

## 2024-07-18 ASSESSMENT — PAIN DESCRIPTION - DESCRIPTORS: DESCRIPTORS: ACHING

## 2024-07-18 ASSESSMENT — PAIN DESCRIPTION - LOCATION: LOCATION: ABDOMEN;FLANK

## 2024-07-18 NOTE — PROGRESS NOTES
Patient stated she never received any ABX in the ER advised it showed scanned at 0510am and its every 24hrs next dose is at 0500am 7/18

## 2024-07-18 NOTE — DISCHARGE INSTRUCTIONS
Discharge Instructions       PATIENT ID: Eugenie Zafar  MRN: [id number]   YOB: 1980    DATE OF ADMISSION: 7/17/2024   DATE OF DISCHARGE: 7/18/2024    PRIMARY CARE PROVIDER: [facility], Pcp     ATTENDING PHYSICIAN: Keo Chan MD   DISCHARGING PROVIDER: Keo Chan MD    To contact this individual call [phone] and ask the  to page.   If unavailable ask to be transferred the Adult Hospitalist Department.    DISCHARGE DIAGNOSES Pyelonephritis     CONSULTATIONS: [unfilled]    PROCEDURES/SURGERIES: * No surgery found *    PENDING TEST RESULTS:   At the time of discharge the following test results are still pending:     FOLLOW UP APPOINTMENTS:   [unfilled]     ADDITIONAL CARE RECOMMENDATIONS:     DIET: regular diet      ACTIVITY: activity as tolerated    WOUND CARE:     EQUIPMENT needed:       DISCHARGE MEDICATIONS:   See Medication Reconciliation Form    It is important that you take the medication exactly as they are prescribed.   Keep your medication in the bottles provided by the pharmacist and keep a list of the medication names, dosages, and times to be taken in your wallet.   Do not take other medications without consulting your doctor.       NOTIFY YOUR PHYSICIAN FOR ANY OF THE FOLLOWING:   Fever over 101 degrees for 24 hours.   Chest pain, shortness of breath, fever, chills, nausea, vomiting, diarrhea, change in mentation, falling, weakness, bleeding. Severe pain or pain not relieved by medications.  Or, any other signs or symptoms that you may have questions about.      DISPOSITION:   x Home With:   OT  PT  HH  RN       SNF/Inpatient Rehab/LTAC    Independent/assisted living    Hospice    Other:     CDMP Checked:   Yes x     PROBLEM LIST Updated:  Yes x       Signed:   Keo Chan MD  7/18/2024  12:02 PM

## 2024-07-18 NOTE — DISCHARGE SUMMARY
1425: Priya RN went over DC paperwork with pt. Pt verbalized understanding of the DC instructions and said that she had no questions at this time. Lewis, nurse extern, took out pt's R arm IV.   
status    x Full code     DNR      PHYSICAL EXAMINATION AT DISCHARGE:    General : alert x 3, awake, no acute distress,   HEENT: PEERL, EOMI, moist mucus membrane, TM clear  Neck: supple, no JVD, no meningeal signs  Chest: Clear to auscultation bilaterally   CVS: S1 S2 heard, Capillary refill less than 2 seconds  Abd: soft/ Non tender, non distended, BS physiological,   Ext: no clubbing, no cyanosis, no edema, brisk 2+ DP pulses  Neuro/Psych: pleasant mood and affect, CN 2-12 grossly intact, sensory grossly within normal limit, Strength 5/5 in all extremities, DTR 1+ x 4  Skin: warm     CHRONIC MEDICAL DIAGNOSES:      Greater than 31 minutes were spent with the patient on counseling and coordination of care    Signed:   Keo Chan MD  7/18/2024  12:04 PM

## 2024-07-18 NOTE — PLAN OF CARE
Problem: Pain  Goal: Verbalizes/displays adequate comfort level or baseline comfort level  7/17/2024 2128 by Kylie Oakes, RN  Outcome: Progressing  7/17/2024 1752 by Lizette Cramer, RN  Outcome: Progressing

## 2024-07-18 NOTE — CARE COORDINATION
RUR: 11%     07/18/24 1057   Readmission Assessment   Number of Days since last admission? 1-7 days   Previous Disposition Home with Family   Who is being Interviewed Patient   What was the patient's/caregiver's perception as to why they think they needed to return back to the hospital? Other (Comment)  (Started experiencing new onset of symptoms upon returning home)   Did you visit your Primary Care Physician after you left the hospital, before you returned this time? No   Why weren't you able to visit your PCP? Did not have an appointment   Did you see a specialist, such as Cardiac, Pulmonary, Orthopedic Physician, etc. after you left the hospital? No   Who advised the patient to return to the hospital? Self-referral   Does the patient report anything that got in the way of taking their medications? No   In our efforts to provide the best possible care to you and others like you, can you think of anything that we could have done to help you after you left the hospital the first time, so that you might not have needed to return so soon? Other (Comment)  (Patient does not feel that anything could have been done differently during her last hospitalization.)     Full CM assessment to follow.    Latisha Fagan LMSW,   [phone]

## 2024-07-18 NOTE — CARE COORDINATION
RUR: 11%  Readmission? Yes  IM? N/A   ? N/A     Plan: home with family/outpatient services. No CM needs identified.     CM met with pt at bedside to discuss discharge plan and complete initial case management assessment. Pt confirmed that her mother can provide her with transportation home from the hospital.     Pharmacy: [facility] - [place], [place]  [address] - P [phone] - F [phone] [[id number]]     Pt lives with her mother and two children at the address listed on her facesheet. Pt reports that she does not use any DME at home and is independent in her ADLs/IADLs. Pt does not have a hx of home O2, home health services, or a stay at a SNF for rehab services. Pt is not a . Pt does not feel that she needs any other resources and/or services at home or in the community at this time. CM noted from chart that pt does not currently have a PCP. CM offered assistance with helping pt find a PCP/schedule a new patient appt. Pt declined assistance with scheduling a PCP appt at this time.     No case management needs identified. Case management available for any discharge needs that arise.     07/18/24 1100   Service Assessment   Patient Orientation Alert and Oriented   Cognition Alert   History Provided By Patient   Primary Caregiver Self   Accompanied By/Relationship None   Support Systems Children;Parent   Patient's Healthcare Decision Maker is: Legal Next of Kin  (Patient is legally seperated from her spouse, Shahriar Rodriguez. Pt's Legal Next of Kin is her mother, Michelle Zafar, [phone].)   PCP Verified by CM Yes  (Patient does not have a PCP)   Last Visit to PCP   (N/A)   Prior Functional Level Independent in ADLs/IADLs   Current Functional Level Independent in ADLs/IADLs   Can patient return to prior living arrangement Yes   Ability to make needs known: Good   Family able to assist with home care needs: Yes  (patient lives with her mother and two children)   Would you like for  Decision Making Capacity  Other persons present: None    Healthcare Decision Maker: Patient is legally  from her , Shahriar. Pt is aware that Shahriar may be her default LNOK without an ACP document. Pt expressed to CM that she is okay with this being the case. If she does not want Shahriar included, she told CM that she will create an ACP document.   Primary Decision Maker: Michelle Zafar - [facility] - [phone]  Click here to complete Healthcare Decision Makers including selection of the Healthcare Decision Maker Relationship (ie \"Primary\").   Today we discussed Healthcare Decision Makers. The patient is considering options.    Content/Action Overview:  Has NO ACP documents-Information provided    Length of Voluntary ACP Conversation in minutes:  <16 minutes (Non-Billable)    Latisha Fagan LMSW,   [phone]

## 2024-07-18 NOTE — PROGRESS NOTES
Bedside and Verbal shift change report given to DIOGENES Powers (oncoming nurse) by DIOGENES Espinal (offgoing nurse). Report included the following information Nurse Handoff Report, Index, ED Encounter Summary, ED SBAR, Adult Overview, Surgery Report, Intake/Output, MAR, Recent Results, Procedure Verification, and Quality Measures.

## 2024-07-21 LAB
BACTERIA SPEC CULT: NORMAL
BACTERIA SPEC CULT: NORMAL
SERVICE CMNT-IMP: NORMAL
SERVICE CMNT-IMP: NORMAL

## 2024-08-17 PROBLEM — N39.0 UTI (URINARY TRACT INFECTION): Status: RESOLVED | Noted: 2024-07-18 | Resolved: 2024-08-17

## 2024-09-18 NOTE — ED TRIAGE NOTES
Problem: PAIN - ADULT  Goal: Verbalizes/displays adequate comfort level or baseline comfort level  Description: Interventions:  - Encourage patient to monitor pain and request assistance  - Assess pain using appropriate pain scale  - Administer analgesics based on type and severity of pain and evaluate response  - Implement non-pharmacological measures as appropriate and evaluate response  - Consider cultural and social influences on pain and pain management  - Notify physician/advanced practitioner if interventions unsuccessful or patient reports new pain  Outcome: Progressing     Problem: INFECTION - ADULT  Goal: Absence or prevention of progression during hospitalization  Description: INTERVENTIONS:  - Assess and monitor for signs and symptoms of infection  - Monitor lab/diagnostic results  - Monitor all insertion sites, i.e. indwelling lines, tubes, and drains  - Monitor endotracheal if appropriate and nasal secretions for changes in amount and color  - Sidney appropriate cooling/warming therapies per order  - Administer medications as ordered  - Instruct and encourage patient and family to use good hand hygiene technique  - Identify and instruct in appropriate isolation precautions for identified infection/condition  Outcome: Progressing      Triage note: Pt reports palpitations and so went to [facility] today and was told, to come to the ER for a possible abnormal EKG. Pt had dental surgery 3 weeks ago. Pt had EKG, Troponin and D Dimer at [facility].

## 2025-08-27 ENCOUNTER — OFFICE VISIT (OUTPATIENT)
Age: 45
End: 2025-08-27
Payer: COMMERCIAL

## 2025-08-27 VITALS
BODY MASS INDEX: 22.34 KG/M2 | WEIGHT: 114.4 LBS | SYSTOLIC BLOOD PRESSURE: 130 MMHG | HEART RATE: 98 BPM | DIASTOLIC BLOOD PRESSURE: 81 MMHG

## 2025-08-27 DIAGNOSIS — D06.9 CIN III (CERVICAL INTRAEPITHELIAL NEOPLASIA GRADE III) WITH SEVERE DYSPLASIA: Primary | ICD-10-CM

## 2025-08-27 PROCEDURE — 99213 OFFICE O/P EST LOW 20 MIN: CPT | Performed by: OBSTETRICS & GYNECOLOGY

## 2025-08-27 ASSESSMENT — PATIENT HEALTH QUESTIONNAIRE - PHQ9
SUM OF ALL RESPONSES TO PHQ QUESTIONS 1-9: 0
1. LITTLE INTEREST OR PLEASURE IN DOING THINGS: NOT AT ALL
SUM OF ALL RESPONSES TO PHQ QUESTIONS 1-9: 0
2. FEELING DOWN, DEPRESSED OR HOPELESS: NOT AT ALL
SUM OF ALL RESPONSES TO PHQ QUESTIONS 1-9: 0
SUM OF ALL RESPONSES TO PHQ QUESTIONS 1-9: 0

## 2025-08-28 ENCOUNTER — TELEPHONE (OUTPATIENT)
Age: 45
End: 2025-08-28

## 2025-09-05 LAB
CYTOLOGIST CVX/VAG CYTO: ABNORMAL
CYTOLOGY CVX/VAG DOC CYTO: ABNORMAL
CYTOLOGY CVX/VAG DOC THIN PREP: ABNORMAL
DX ICD CODE: ABNORMAL
HPV GENOTYPE REFLEX: ABNORMAL
HPV I/H RISK 4 DNA CVX QL PROBE+SIG AMP: POSITIVE
HPV16 DNA CVX QL PROBE+SIG AMP: NEGATIVE
HPV18+45 E6+E7 MRNA CVX QL NAA+PROBE: NEGATIVE
OTHER STN SPEC: ABNORMAL
SERVICE CMNT-IMP: ABNORMAL
STAT OF ADQ CVX/VAG CYTO-IMP: ABNORMAL

## (undated) DEVICE — DEVICE TISS RETRV 3MMX25.25IN -- MYOSURE

## (undated) DEVICE — SWAB MEDICATED 8 IN PROCTO

## (undated) DEVICE — GOWN,SIRUS,FABRNF,XL,20/CS: Brand: MEDLINE

## (undated) DEVICE — SPONGE GZ W4XL4IN COT RADPQ HIGHLY ABSRB

## (undated) DEVICE — SOLUTION IRRIG 3000ML 0.9% SOD CHL USP UROMATIC PLAS CONT

## (undated) DEVICE — DRAPE CAM W7XL96IN W/ BLU KRATON TIP FOR LSR VID

## (undated) DEVICE — COVER LT HNDL PLAS RIG 1 PER PK

## (undated) DEVICE — PAD,SANITARY,11 IN,MAXI,N-STRL,IND WRAP: Brand: MEDLINE

## (undated) DEVICE — 1000 SES SMOKE EVACUATION SYSTEM, HAND HELD TUBING SET: Brand: 1000 SES

## (undated) DEVICE — REDUCER FITTING 7/8" TO 1/4": Brand: BUFFALO FILTER

## (undated) DEVICE — COVER,TABLE,60X90,STERILE: Brand: MEDLINE

## (undated) DEVICE — CATHETER,URETHRAL,REDRUBBER,STRL,16FR: Brand: MEDLINE

## (undated) DEVICE — SHEET,DRAPE,UNDERBUTTOCK,GRAD POUCH,PORT: Brand: MEDLINE

## (undated) DEVICE — GARMENT,MEDLINE,DVT,INT,CALF,MED, GEN2: Brand: MEDLINE

## (undated) DEVICE — GOWN,SIRUS,NONRNF,SETINSLV,XL,20/CS: Brand: MEDLINE

## (undated) DEVICE — TRAY PREP DRY W/ PREM GLV 2 APPL 6 SPNG 2 UNDPD 1 OVERWRAP

## (undated) DEVICE — MARKER,SKIN,WI/RULER AND LABELS: Brand: MEDLINE

## (undated) DEVICE — PREMIUM WET SKIN PREP TRAY: Brand: MEDLINE INDUSTRIES, INC.

## (undated) DEVICE — TOWEL SURG W17XL27IN STD BLU COT NONFENESTRATED PREWASHED

## (undated) DEVICE — BASIC SINGLE BASIN BTC-LF: Brand: MEDLINE INDUSTRIES, INC.

## (undated) DEVICE — BLADE, TONGUE, 6", STERILE: Brand: MEDLINE

## (undated) DEVICE — TUBE SUC FUN END 9/32INX12FT

## (undated) DEVICE — FLUID MGMT SYS FLUENT KIT 6/PK

## (undated) DEVICE — DRAPE,LITHOTOMY,STERILE: Brand: MEDLINE

## (undated) DEVICE — GLOVE ORANGE PI 7   MSG9070

## (undated) DEVICE — LEGGINGS, PAIR, 31X48, STERILE: Brand: MEDLINE

## (undated) DEVICE — HYPODERMIC SAFETY NEEDLE: Brand: MAGELLAN

## (undated) DEVICE — DEVICE TSSUE RMVL D3MM L25.25N ENDSCPC INTRTRNE PLPS FBRDS M

## (undated) DEVICE — SYRINGE MED 10ML LUERLOCK TIP W/O SFTY DISP

## (undated) DEVICE — TUBING SMK EVAC L6FT DIA7/8IN

## (undated) DEVICE — TOWEL,OR,DSP,ST,BLUE,STD,2/PK,40PK/CS: Brand: MEDLINE

## (undated) DEVICE — SYRINGE IRRIG 60ML SFT PLIABLE BLB EZ TO GRP 1 HND USE W/

## (undated) DEVICE — GLOVE SURG SZ 75 L12IN FNGR THK79MIL GRN LTX FREE

## (undated) DEVICE — SET SEALS HYSTEROSCOPE DISP -- MYOSURE  EA=10

## (undated) DEVICE — SET ENDOSCP SEAL HYSTEROSCOPE RIG OUTFLO CHN DISP MYOSURE

## (undated) DEVICE — INTENT OT USE PROVIDES A STERILE INTERFACE BETWEEN THE OPERATING ROOM SURGICAL LAMPS (NON-STERILE) AND THE SURGEON OR STAFF WORKING IN THE STERILE FIELD.: Brand: ASPEN® ALC PLUS LIGHT HANDLE COVER